# Patient Record
Sex: FEMALE | Race: BLACK OR AFRICAN AMERICAN | Employment: PART TIME | URBAN - METROPOLITAN AREA
[De-identification: names, ages, dates, MRNs, and addresses within clinical notes are randomized per-mention and may not be internally consistent; named-entity substitution may affect disease eponyms.]

---

## 2017-03-28 ENCOUNTER — OFFICE VISIT (OUTPATIENT)
Dept: INTERNAL MEDICINE CLINIC | Age: 67
End: 2017-03-28

## 2017-03-28 VITALS
HEART RATE: 72 BPM | SYSTOLIC BLOOD PRESSURE: 131 MMHG | WEIGHT: 212.4 LBS | TEMPERATURE: 97.8 F | BODY MASS INDEX: 40.1 KG/M2 | HEIGHT: 61 IN | OXYGEN SATURATION: 97 % | DIASTOLIC BLOOD PRESSURE: 84 MMHG

## 2017-03-28 DIAGNOSIS — Z00.00 ROUTINE GENERAL MEDICAL EXAMINATION AT A HEALTH CARE FACILITY: ICD-10-CM

## 2017-03-28 DIAGNOSIS — I10 ESSENTIAL HYPERTENSION: ICD-10-CM

## 2017-03-28 DIAGNOSIS — E11.9 CONTROLLED TYPE 2 DIABETES MELLITUS WITHOUT COMPLICATION, WITHOUT LONG-TERM CURRENT USE OF INSULIN (HCC): Primary | ICD-10-CM

## 2017-03-28 DIAGNOSIS — E78.00 PURE HYPERCHOLESTEROLEMIA: ICD-10-CM

## 2017-03-28 DIAGNOSIS — J44.9 CHRONIC OBSTRUCTIVE PULMONARY DISEASE, UNSPECIFIED COPD TYPE (HCC): ICD-10-CM

## 2017-03-28 DIAGNOSIS — Z13.39 SCREENING FOR ALCOHOLISM: ICD-10-CM

## 2017-03-28 NOTE — MR AVS SNAPSHOT
Visit Information Date & Time Provider Department Dept. Phone Encounter #  
 3/28/2017 11:30 AM Kalani Cooper, [address] [place] [address],[address] [phone] [id number] Follow-up Instructions Return in about 4 months (around 7/28/2017) for dm-2 htn hld copd. Upcoming Health Maintenance Date Due  
 EYE EXAM RETINAL OR DILATED Q1 6/3/2014 GLAUCOMA SCREENING Q2Y 11/30/2015 MEDICARE YEARLY EXAM 11/30/2015 Pneumococcal 65+ Low/Medium Risk (2 of 2 - PCV13) 1/25/2017 FOOT EXAM Q1 1/25/2017 MICROALBUMIN Q1 4/25/2017 HEMOGLOBIN A1C Q6M 5/23/2017 LIPID PANEL Q1 8/4/2017 BREAST CANCER SCRN MAMMOGRAM 3/21/2018 COLONOSCOPY 9/12/2018 DTaP/Tdap/Td series (2 - Td) 6/30/2025 Allergies as of 3/28/2017  Review Complete On: 3/28/2017 By: Kalani Copoer MD  
 No Known Allergies Current Immunizations  Reviewed on 1/6/2014 Name Date Influenza Vaccine Split 11/15/2011  3:42 PM  
 Pneumococcal Polysaccharide (PPSV-23) 1/25/2016 Tdap 6/30/2015 Not reviewed this visit You Were Diagnosed With   
  
 Codes Comments Controlled type 2 diabetes mellitus without complication, without long-term current use of insulin ([facility]    -  Primary ICD-10-CM: E11.9 ICD-9-CM: 250.00 Essential hypertension     ICD-10-CM: I10 
ICD-9-CM: 401.9 Pure hypercholesterolemia     ICD-10-CM: E78.00 ICD-9-CM: 272.0 Chronic obstructive pulmonary disease, unspecified COPD type ([facility]     ICD-10-CM: J44.9 ICD-9-CM: 221 Routine general medical examination at a health care facility     ICD-10-CM: Z00.00 ICD-9-CM: V70.0 Screening for alcoholism     ICD-10-CM: Z13.89 ICD-9-CM: V79.1 Vitals BP Pulse Temp Height(growth percentile) Weight(growth percentile) SpO2  
 131/84 (BP 1 Location: Left arm, BP Patient Position: Sitting) 72 97.8 °F (36.6 °C) (Oral) 5' 1\" (1.549 m) 212 lb 6.4 oz (96.3 kg) 97% BMI OB Status Smoking Status 40.13 kg/m2 Postmenopausal Former Smoker BMI and BSA Data Body Mass Index Body Surface Area  
 40.13 kg/m 2 2.04 m 2 Preferred Pharmacy Pharmacy Name Phone Kirsty Caballero [address]  [place] [phone] Your Updated Medication List  
  
   
This list is accurate as of: 3/28/17 12:13 PM.  Always use your most recent med list.  
  
  
  
  
 * albuterol 2.5 mg /3 mL (0.083 %) nebulizer solution Commonly known as:  PROVENTIL VENTOLIN  
1.5 mL by Nebulization route every four (4) hours as needed for Wheezing. * VENTOLIN HFA 90 mcg/actuation inhaler Generic drug:  albuterol INHALE 2 PUFFS BY MOUTH EVERY 4 HOURS AS NEEDED FOR WHEEZING  
  
 albuterol 90 mcg/actuation inhaler Commonly known as:  Zannie Boatman Take 2 Puffs by inhalation every six (6) hours as needed. aspirin, buffered 81 mg Tab Take  by mouth. atorvastatin 10 mg tablet Commonly known as:  LIPITOR Take 1 Tab by mouth nightly. fluticasone-vilanterol 200-25 mcg/dose inhaler Commonly known as:  BREO ELLIPTA Take 1 Puff by inhalation daily. glucose blood VI test strips strip Commonly known as:  TRUETRACK TEST  
1 Each by Does Not Apply route Daily (before breakfast). lisinopril-hydroCHLOROthiazide 20-25 mg per tablet Commonly known as:  Reynaldo Arthur Take 1 Tab by mouth daily. metFORMIN 500 mg tablet Commonly known as:  GLUCOPHAGE Take 1 Tab by mouth two (2) times daily (with meals). MULTIVITAL PO Take  by mouth daily. * Notice: This list has 2 medication(s) that are the same as other medications prescribed for you. Read the directions carefully, and ask your doctor or other care provider to review them with you. We Performed the Following HEMOGLOBIN A1C WITH EAG [31423 CPT(R)] LIPID PANEL [74365 CPT(R)] METABOLIC PANEL, COMPREHENSIVE [63387 CPT(R)] Follow-up Instructions Return in about 4 months (around 7/28/2017) for dm-2 htn hld copd. Introducing [facility] & HEALTH SERVICES! Melinda Jennifer introduces FAD ? IO patient portal. Now you can access parts of your medical record, email your doctor's office, and request medication refills online. 1. In your internet browser, go to [email or web address] 2. Click on the First Time User? Click Here link in the Sign In box. You will see the New Member Sign Up page. 3. Enter your F[employer] Access Code exactly as it appears below. You will not need to use this code after youve completed the sign-up process. If you do not sign up before the expiration date, you must request a new code. · [employer] Access Code: [email or web address] Expires: 6/26/2017 12:11 PM 
 
4. Enter the last four digits of your Social Security Number (xxxx) and Date of Birth (mm/dd/yyyy) as indicated and click Submit. You will be taken to the next sign-up page. 5. Create a FAD [employer] ID. This will be your [employer] login ID and cannot be changed, so think of one that is secure and easy to remember. 6. Create a [employer] IO password. You can change your password at any time. 7. Enter your Password Reset Question and Answer. This can be used at a later time if you forget your password. 8. Enter your e-mail address. You will receive e-mail notification when new information is available in [address] 9. Click Sign Up. You can now view and download portions of your medical record. 10. Click the Download Summary menu link to download a portable copy of your medical information. If you have questions, please visit the Frequently Asked Questions section of the FAD ? IO website. Remember, FAD ? IO is NOT to be used for urgent needs. For medical emergencies, dial 911. Now available from your iPhone and Android! Please provide this summary of care documentation to your next provider. Your primary care clinician is listed as Jaskaran GÓMEZ. If you have any questions after today's visit, please call [phone].

## 2017-03-28 NOTE — PROGRESS NOTES
HISTORY OF PRESENT ILLNESS  Eder Alcaraz is a 77 y.o. female. HPI     F/u DM-2 htn hld   fsbs in am avg about 120 per pt  Started to exercise with a  and feels well  Did not fill rx for breo and did not see cardiologist for ROLON   No cp sxs  No sxs of neuropathy in feet per pt    Patient Active Problem List    Diagnosis Date Noted    COPD (chronic obstructive pulmonary disease) ([facility] 06/30/2015    Type II or unspecified type diabetes mellitus without mention of complication, not stated as uncontrolled 09/03/2013    Obesity 05/10/2011    HTN (hypertension) 11/08/2010    Asthma 02/21/2010    Pure hypercholesterolemia 02/21/2010    Colon polyp 02/21/2010    Fibrocystic breast 02/21/2010    Fibroid uterus 02/21/2010    Colitis 02/21/2010     Current Outpatient Prescriptions   Medication Sig Dispense Refill    VENTOLIN HFA 90 mcg/actuation inhaler INHALE 2 PUFFS BY MOUTH EVERY 4 HOURS AS NEEDED FOR WHEEZING 1 Inhaler 6    metFORMIN (GLUCOPHAGE) 500 mg tablet Take 1 Tab by mouth two (2) times daily (with meals). 60 Tab 11    atorvastatin (LIPITOR) 10 mg tablet Take 1 Tab by mouth nightly. 90 Tab 3    lisinopril-hydrochlorothiazide (PRINZIDE, ZESTORETIC) 20-25 mg per tablet Take 1 Tab by mouth daily. 90 Tab 3    Aspirin, Buffered 81 mg tab Take  by mouth.  albuterol (PROVENTIL VENTOLIN) 2.5 mg /3 mL (0.083 %) nebulizer solution 1.5 mL by Nebulization route every four (4) hours as needed for Wheezing. 1 Package 6    glucose blood VI test strips (TRUETRACK TEST) strip 1 Each by Does Not Apply route Daily (before breakfast). 1 Package 11    FA/MV,CA,FE,MIN/LYCOPENE/LUT (MULTIVITAL PO) Take  by mouth daily.  albuterol (PROVENTIL, VENTOLIN) 90 mcg/Actuation inhaler Take 2 Puffs by inhalation every six (6) hours as needed. 90 g 11    fluticasone-vilanterol (BREO ELLIPTA) 200-25 mcg/dose inhaler Take 1 Puff by inhalation daily.  1 Each 11     Past Medical History:   Diagnosis Date    Asthma  Hypertension       Lab Results  Component Value Date/Time   Hemoglobin A1c 7.2 11/23/2016 12:25 PM   Hemoglobin A1c 7.0 06/30/2015 10:14 AM   Hemoglobin A1c 7.0 10/30/2014 02:38 PM   Glucose 87 11/23/2016 12:25 PM   Glucose POC 88 11/23/2012 03:22 PM   Microalb/Creat ratio (ug/mg creat.) 4.7 04/25/2016 08:43 AM   LDL, calculated 106 08/04/2016 10:08 AM   Creatinine 0.80 11/23/2016 12:25 PM      Lab Results  Component Value Date/Time   Cholesterol, total 172 08/04/2016 10:08 AM   HDL Cholesterol 52 08/04/2016 10:08 AM   LDL, calculated 106 08/04/2016 10:08 AM   Triglyceride 72 08/04/2016 10:08 AM       Lab Results  Component Value Date/Time   GFR est AA 89 11/23/2016 12:25 PM   GFR est non-AA 78 11/23/2016 12:25 PM   Creatinine 0.80 11/23/2016 12:25 PM   BUN 10 11/23/2016 12:25 PM   Sodium 144 11/23/2016 12:25 PM   Potassium 4.6 11/23/2016 12:25 PM   Chloride 103 11/23/2016 12:25 PM   CO2 26 11/23/2016 12:25 PM         ROS    Physical Exam   Constitutional: She appears well-developed and well-nourished. Appears stated age   Cardiovascular: Normal rate, regular rhythm and normal heart sounds. Exam reveals no gallop and no friction rub. No murmur heard. Pulmonary/Chest: Effort normal and breath sounds normal. No respiratory distress. She has no wheezes. Abdominal: Soft. Bowel sounds are normal.   Musculoskeletal: She exhibits no edema. Neurological: She is alert. Diabetic foot exam performed by Marylene Prim, MD       Measurement  Response Nurse Comment Physician Comment  Monofilament  R - normal sensation with micro filament  L - normal sensation with micro filament    Pulse DP R - 2+ (normal)  L - 2+ (normal)    Pulse TP R - 2+ (normal)  L - 2+ (normal)    Structural deformity R - None  L - None    Skin Integrity / Deformity R - None  L - None       Reviewed by:         Psychiatric: She has a normal mood and affect. Nursing note and vitals reviewed.       ASSESSMENT and PLAN  Beata Sellers was seen today for diabetes, hypertension and cholesterol problem. Diagnoses and all orders for this visit:    Controlled type 2 diabetes mellitus without complication, without long-term current use of insulin (HCC)  -     METABOLIC PANEL, COMPREHENSIVE  -     HEMOGLOBIN A1C WITH EAG   contorlled per pt log but last a1c was 7.2 , can titate up metformin if still above goal    Essential hypertension  -     METABOLIC PANEL, COMPREHENSIVE   controlled  Pure hypercholesterolemia  -     METABOLIC PANEL, COMPREHENSIVE  -     LIPID PANEL    Chronic obstructive pulmonary disease, unspecified COPD type (HCC)   Mild by sxs, albuterol prn  Routine general medical examination at a health care facility    Screening for alcoholism      Follow-up Disposition:  Return in about 4 months (around 7/28/2017) for dm-2 htn hld copd.

## 2017-03-28 NOTE — PROGRESS NOTES
This is a Subsequent Medicare Annual Wellness Visit providing Personalized Prevention Plan Services (PPPS) (Performed 12 months after initial AWV and PPPS )    I have reviewed the patient's medical history in detail and updated the computerized patient record. History     Past Medical History:   Diagnosis Date    Asthma     Hypertension       No past surgical history on file. Current Outpatient Prescriptions   Medication Sig Dispense Refill    VENTOLIN HFA 90 mcg/actuation inhaler INHALE 2 PUFFS BY MOUTH EVERY 4 HOURS AS NEEDED FOR WHEEZING 1 Inhaler 6    metFORMIN (GLUCOPHAGE) 500 mg tablet Take 1 Tab by mouth two (2) times daily (with meals). 60 Tab 11    atorvastatin (LIPITOR) 10 mg tablet Take 1 Tab by mouth nightly. 90 Tab 3    lisinopril-hydrochlorothiazide (PRINZIDE, ZESTORETIC) 20-25 mg per tablet Take 1 Tab by mouth daily. 90 Tab 3    Aspirin, Buffered 81 mg tab Take  by mouth.  albuterol (PROVENTIL VENTOLIN) 2.5 mg /3 mL (0.083 %) nebulizer solution 1.5 mL by Nebulization route every four (4) hours as needed for Wheezing. 1 Package 6    glucose blood VI test strips (TRUETRACK TEST) strip 1 Each by Does Not Apply route Daily (before breakfast). 1 Package 11    FA/MV,CA,FE,MIN/LYCOPENE/LUT (MULTIVITAL PO) Take  by mouth daily.  albuterol (PROVENTIL, VENTOLIN) 90 mcg/Actuation inhaler Take 2 Puffs by inhalation every six (6) hours as needed. 90 g 11    fluticasone-vilanterol (BREO ELLIPTA) 200-25 mcg/dose inhaler Take 1 Puff by inhalation daily.  1 Each 11     No Known Allergies  Family History   Problem Relation Age of Onset    Diabetes Mother     High Cholesterol Mother     Hypertension Mother     Diabetes Sister     Hypertension Sister      Social History   Substance Use Topics    Smoking status: Former Smoker     Packs/day: 0.50     Years: 30.00     Quit date: 1/1/2011    Smokeless tobacco: Never Used    Alcohol use No     Patient Active Problem List   Diagnosis Code    Asthma J45.909    Pure hypercholesterolemia E78.00    Colon polyp K63.5    Fibrocystic breast N60.19    Fibroid uterus D25.9    Colitis K52.9    HTN (hypertension) I10    Obesity E66.9    Type II or unspecified type diabetes mellitus without mention of complication, not stated as uncontrolled E11.9    COPD (chronic obstructive pulmonary disease) ([facility] J44.9       Depression Risk Factor Screening:     PHQ 2 / 9, over the last two weeks 11/23/2016   Little interest or pleasure in doing things Not at all   Feeling down, depressed or hopeless Not at all   Total Score PHQ 2 0     Alcohol Risk Factor Screening: On any occasion during the past 3 months, have you had more than 3 drinks containing alcohol? No    Do you average more than 7 drinks per week? No      Functional Ability and Level of Safety:     Hearing Loss   mild    Activities of Daily Living   Self-care. Requires assistance with: no ADLs    Fall Risk     Fall Risk Assessment, last 12 mths 11/23/2016   Able to walk? Yes   Fall in past 12 months? No     Abuse Screen   Patient is not abused    Review of Systems   A comprehensive review of systems was negative except for that written in the HPI. Physical Examination     Evaluation of Cognitive Function:  Mood/affect:  neutral  Appearance: age appropriate, casually dressed and younger than stated age  Family member/caregiver input: none    Visit Vitals    /84 (BP 1 Location: Left arm, BP Patient Position: Sitting)    Pulse 72    Temp 97.8 °F (36.6 °C) (Oral)    Ht 5' 1\" (1.549 m)    Wt 212 lb 6.4 oz (96.3 kg)    SpO2 97%    BMI 40.13 kg/m2     General:  Alert, cooperative, no distress, appears stated age. Head:  Normocephalic, without obvious abnormality, atraumatic. Eyes:  Conjunctivae/corneas clear. PERRL, EOMs intact. Fundi benign. Ears:  Normal TMs and external ear canals both ears. Nose: Nares normal. Septum midline. Mucosa normal. No drainage or sinus tenderness. Throat: Lips, mucosa, and tongue normal. Teeth and gums normal.   Neck: Supple, symmetrical, trachea midline, no adenopathy, thyroid: no enlargement/tenderness/nodules, no carotid bruit and no JVD. Back:   Symmetric, no curvature. ROM normal. No CVA tenderness. Lungs:   Clear to auscultation bilaterally. Chest wall:  No tenderness or deformity. Heart:  Regular rate and rhythm, S1, S2 normal, no murmur, click, rub or gallop. Breast Exam:  No tenderness, masses, or nipple abnormality. Abdomen:   Soft, non-tender. Bowel sounds normal. No masses,  No organomegaly. Genitalia:  Normal female without lesion, discharge or tenderness. Rectal:  Normal tone,  no masses or tenderness  Guaiac negative stool. Extremities: Extremities normal, atraumatic, no cyanosis or edema. Pulses: 2+ and symmetric all extremities. Skin: Skin color, texture, turgor normal. No rashes or lesions. Lymph nodes: Cervical, supraclavicular, and axillary nodes normal.   Neurologic: CNII-XII intact. Normal strength, sensation and reflexes throughout. Patient Care Team:  Viji Enriquez MD as PCP - General    Advice/Referrals/Counseling   Education and counseling provided:  Are appropriate based on today's review and evaluation  End-of-Life planning (with patient's consent)-see ACP note  Pneumococcal Vaccine-advised prevar 13 at local pharmacy  Screening for glaucoma-pt will eye an eye doctor for dilated exam      Assessment/Plan   Isabel Esparza was seen today for diabetes, hypertension and cholesterol problem.     Diagnoses and all orders for this visit:    Controlled type 2 diabetes mellitus without complication, without long-term current use of insulin (HCC)  -     METABOLIC PANEL, COMPREHENSIVE  -     HEMOGLOBIN A1C WITH EAG    Essential hypertension  -     METABOLIC PANEL, COMPREHENSIVE    Pure hypercholesterolemia  -     METABOLIC PANEL, COMPREHENSIVE  -     LIPID PANEL    Chronic obstructive pulmonary disease, unspecified COPD type [facility])      Follow-up Disposition:  Return in about 4 months (around 7/28/2017) for dm-2 htn hld copd. Maverick Short

## 2017-03-29 LAB
ALBUMIN SERPL-MCNC: 3.9 G/DL (ref 3.6–4.8)
ALBUMIN/GLOB SERPL: 1.3 {RATIO} (ref 1.2–2.2)
ALP SERPL-CCNC: 109 IU/L (ref 39–117)
ALT SERPL-CCNC: 23 IU/L (ref 0–32)
AST SERPL-CCNC: 24 IU/L (ref 0–40)
BILIRUB SERPL-MCNC: 0.5 MG/DL (ref 0–1.2)
BUN SERPL-MCNC: 11 MG/DL (ref 8–27)
BUN/CREAT SERPL: 16 (ref 11–26)
CALCIUM SERPL-MCNC: 9.1 MG/DL (ref 8.7–10.3)
CHLORIDE SERPL-SCNC: 102 MMOL/L (ref 96–106)
CHOLEST SERPL-MCNC: 177 MG/DL (ref 100–199)
CO2 SERPL-SCNC: 28 MMOL/L (ref 18–29)
CREAT SERPL-MCNC: 0.7 MG/DL (ref 0.57–1)
EST. AVERAGE GLUCOSE BLD GHB EST-MCNC: 148 MG/DL
GLOBULIN SER CALC-MCNC: 3.1 G/DL (ref 1.5–4.5)
GLUCOSE SERPL-MCNC: 91 MG/DL (ref 65–99)
HBA1C MFR BLD: 6.8 % (ref 4.8–5.6)
HDLC SERPL-MCNC: 56 MG/DL
LDLC SERPL CALC-MCNC: 103 MG/DL (ref 0–99)
POTASSIUM SERPL-SCNC: 4.4 MMOL/L (ref 3.5–5.2)
PROT SERPL-MCNC: 7 G/DL (ref 6–8.5)
SODIUM SERPL-SCNC: 144 MMOL/L (ref 134–144)
TRIGL SERPL-MCNC: 92 MG/DL (ref 0–149)
VLDLC SERPL CALC-MCNC: 18 MG/DL (ref 5–40)

## 2017-07-31 DIAGNOSIS — J45.909 ASTHMATIC BRONCHITIS, UNSPECIFIED ASTHMA SEVERITY, UNCOMPLICATED: ICD-10-CM

## 2017-07-31 RX ORDER — ALBUTEROL SULFATE 90 UG/1
2 AEROSOL, METERED RESPIRATORY (INHALATION)
Qty: 1 INHALER | Refills: 6 | Status: SHIPPED | OUTPATIENT
Start: 2017-07-31 | End: 2017-12-14 | Stop reason: ALTCHOICE

## 2017-08-22 ENCOUNTER — OFFICE VISIT (OUTPATIENT)
Dept: INTERNAL MEDICINE CLINIC | Age: 67
End: 2017-08-22

## 2017-08-22 VITALS
OXYGEN SATURATION: 97 % | HEART RATE: 73 BPM | HEIGHT: 61 IN | TEMPERATURE: 97.9 F | WEIGHT: 212 LBS | SYSTOLIC BLOOD PRESSURE: 131 MMHG | DIASTOLIC BLOOD PRESSURE: 72 MMHG | BODY MASS INDEX: 40.02 KG/M2

## 2017-08-22 DIAGNOSIS — J44.9 CHRONIC OBSTRUCTIVE PULMONARY DISEASE, UNSPECIFIED COPD TYPE (HCC): ICD-10-CM

## 2017-08-22 DIAGNOSIS — M25.511 ACUTE PAIN OF RIGHT SHOULDER: ICD-10-CM

## 2017-08-22 DIAGNOSIS — I10 ESSENTIAL HYPERTENSION: ICD-10-CM

## 2017-08-22 DIAGNOSIS — Z23 ENCOUNTER FOR IMMUNIZATION: ICD-10-CM

## 2017-08-22 DIAGNOSIS — E78.00 PURE HYPERCHOLESTEROLEMIA: ICD-10-CM

## 2017-08-22 DIAGNOSIS — E11.9 CONTROLLED TYPE 2 DIABETES MELLITUS WITHOUT COMPLICATION, WITHOUT LONG-TERM CURRENT USE OF INSULIN (HCC): Primary | ICD-10-CM

## 2017-08-22 DIAGNOSIS — Z12.31 VISIT FOR SCREENING MAMMOGRAM: ICD-10-CM

## 2017-08-22 RX ORDER — METFORMIN HYDROCHLORIDE 500 MG/1
500 TABLET ORAL 2 TIMES DAILY WITH MEALS
Qty: 180 TAB | Refills: 3 | Status: SHIPPED | OUTPATIENT
Start: 2017-08-22 | End: 2018-10-30 | Stop reason: SDUPTHER

## 2017-08-22 NOTE — MR AVS SNAPSHOT
Visit Information Date & Time Provider Department Dept. Phone Encounter #  
 8/22/2017  3:00 PM Larissa Dumont, [address] [place] [address],[address] [phone] [id number] Follow-up Instructions Return in about 4 months (around 12/22/2017) for dm-2 htn hld. Upcoming Health Maintenance Date Due  
 EYE EXAM RETINAL OR DILATED Q1 6/3/2014 GLAUCOMA SCREENING Q2Y 11/30/2015 Pneumococcal 65+ Low/Medium Risk (2 of 2 - PCV13) 1/25/2017 MICROALBUMIN Q1 4/25/2017 INFLUENZA AGE 9 TO ADULT 8/1/2017 HEMOGLOBIN A1C Q6M 9/28/2017 BREAST CANCER SCRN MAMMOGRAM 3/21/2018 FOOT EXAM Q1 3/28/2018 LIPID PANEL Q1 3/28/2018 MEDICARE YEARLY EXAM 3/29/2018 COLONOSCOPY 9/12/2018 DTaP/Tdap/Td series (2 - Td) 6/30/2025 Allergies as of 8/22/2017  Review Complete On: 8/22/2017 By: Larissa Dumont MD  
 No Known Allergies Current Immunizations  Reviewed on 1/6/2014 Name Date Influenza Vaccine Split 11/15/2011  3:42 PM  
 Pneumococcal Conjugate (PCV-13)  Incomplete Pneumococcal Polysaccharide (PPSV-23) 1/25/2016 Tdap 6/30/2015 Not reviewed this visit You Were Diagnosed With   
  
 Codes Comments Controlled type 2 diabetes mellitus without complication, without long-term current use of insulin [facility]    -  Primary ICD-10-CM: E11.9 ICD-9-CM: 250.00 Essential hypertension     ICD-10-CM: I10 
ICD-9-CM: 401.9 Pure hypercholesterolemia     ICD-10-CM: E78.00 ICD-9-CM: 272.0 Chronic obstructive pulmonary disease, unspecified COPD type ([facility]     ICD-10-CM: J44.9 ICD-9-CM: 829 Encounter for immunization     ICD-10-CM: B14 ICD-9-CM: V03.89 Visit for screening mammogram     ICD-10-CM: Z12.31 
ICD-9-CM: V76.12 Acute pain of right shoulder     ICD-10-CM: M25.511 ICD-9-CM: 719.41 Vitals  BP Pulse Temp Height(growth percentile) Weight(growth percentile) SpO2  
 131/72 (BP 1 Location: Left arm, BP Patient Position: Sitting) 73 97.9 °F (36.6 °C) (Oral) 5' 1\" (1.549 m) 212 lb (96.2 kg) 97% BMI OB Status Smoking Status 40.06 kg/m2 Postmenopausal Former Smoker BMI and BSA Data Body Mass Index Body Surface Area 40.06 kg/m 2 2.03 m 2 Preferred Pharmacy Pharmacy Name Phone Kirsty Caballero [address] [address]  [place] [phone] Your Updated Medication List  
  
   
This list is accurate as of: 8/22/17  3:19 PM.  Always use your most recent med list.  
  
  
  
  
 * albuterol 2.5 mg /3 mL (0.083 %) nebulizer solution Commonly known as:  PROVENTIL VENTOLIN  
1.5 mL by Nebulization route every four (4) hours as needed for Wheezing. * albuterol 90 mcg/actuation inhaler Commonly known as:  VENTOLIN HFA Take 2 Puffs by inhalation every four (4) hours as needed for Wheezing. albuterol 90 mcg/actuation inhaler Commonly known as:  Anahi Bouche Take 2 Puffs by inhalation every six (6) hours as needed. aspirin, buffered 81 mg Tab Take  by mouth. atorvastatin 10 mg tablet Commonly known as:  LIPITOR Take 1 Tab by mouth nightly. fluticasone-vilanterol 200-25 mcg/dose inhaler Commonly known as:  BREO ELLIPTA Take 1 Puff by inhalation daily. glucose blood VI test strips strip Commonly known as:  TRUETRACK TEST  
1 Each by Does Not Apply route Daily (before breakfast). lisinopril-hydroCHLOROthiazide 20-25 mg per tablet Commonly known as:  Nuno Sarah Take 1 Tab by mouth daily. metFORMIN 500 mg tablet Commonly known as:  GLUCOPHAGE Take 1 Tab by mouth two (2) times daily (with meals). MULTIVITAL PO Take  by mouth daily. * Notice: This list has 2 medication(s) that are the same as other medications prescribed for you. Read the directions carefully, and ask your doctor or other care provider to review them with you. Prescriptions Sent to Pharmacy Refills  
 metFORMIN (GLUCOPHAGE) 500 mg tablet 3 Sig: Take 1 Tab by mouth two (2) times daily (with meals). Class: Normal  
 Pharmacy: [facility] [address] Celine Rubinstein [address] [place] [address] #: [phone] Route: Oral  
  
We Performed the Following HEMOGLOBIN A1C WITH EAG [84654 CPT(R)] LIPID PANEL [37991 CPT(R)] MICROALBUMIN, UR, RAND W/ MICROALBUMIN/CREA RATIO [id number] CPT(R)] PNEUMOCOCCAL CONJ VACCINE 13 VALENT IM [id number] CPT(R)] REFERRAL TO ORTHOPEDICS [CJT915 Custom] Comments:  
 Please evaluate patient for shoulder pain. Follow-up Instructions Return in about 4 months (around 12/22/2017) for dm-2 htn hld. To-Do List   
 08/28/2017 Imaging:  [facility] MAMMO BI SCREENING INCL CAD Referral Information Referral ID Referred By Referred To  
  
 [id number] REINALDO GÓMEZ [facility]   
   [address] Phone: [phone] Visits Status Start Date End Date 1 New Request 8/22/17 8/22/18 If your referral has a status of pending review or denied, additional information will be sent to support the outcome of this decision. [facility]! [facility] introduces [employer] patient portal. Now you can access parts of your medical record, email your doctor's office, and request medication refills online. 1. In your internet browser, go to [email or web address] 2. Click on the First Time User? Click Here link in the Sign In box. You will see the New Member Sign Up page. 3. Enter your [employer] Access Code exactly as it appears below. You will not need to use this code after youve completed the sign-up process. If you do not sign up before the expiration date, you must request a new code. · [employer] Access Code: [email or web address] Expires: 11/20/2017  3:18 PM 
 
 4. Enter the last four digits of your Social Security Number (xxxx) and Date of Birth (mm/dd/yyyy) as indicated and click Submit. You will be taken to the next sign-up page. 5. Create a [employer] ID. This will be your [employer] login ID and cannot be changed, so think of one that is secure and easy to remember. 6. Create a [employer] password. You can change your password at any time. 7. Enter your Password Reset Question and Answer. This can be used at a later time if you forget your password. 8. Enter your e-mail address. You will receive e-mail notification when new information is available in [address] 9. Click Sign Up. You can now view and download portions of your medical record. 10. Click the Download Summary menu link to download a portable copy of your medical information. If you have questions, please visit the Frequently Asked Questions section of the [employer] website. Remember, [employer] is NOT to be used for urgent needs. For medical emergencies, dial 911. Now available from your iPhone and Android! Please provide this summary of care documentation to your next provider. Your primary care clinician is listed as Maria Esther GÓMEZ. If you have any questions after today's visit, please call [phone].

## 2017-08-22 NOTE — PROGRESS NOTES
HISTORY OF PRESENT ILLNESS  Alexia Valiente is a 77 y.o. female. HPI      F/u DM-2 htn hld   fsbs in am avg about 120 per pt  After meals 1 hr later 102  Started to exercise with a  once per week and goes 3 other days per week and feels well  Did not fill rx for breo and did not see cardiologist for ROLNO   No cp sxs  No sxs of neuropathy in feet per pt  She landed on her right arm in June and sill has some pain with ROM --external ROM       Patient Active Problem List    Diagnosis Date Noted    COPD (chronic obstructive pulmonary disease) ([facility] [address] 06/30/2015    Type II or unspecified type diabetes mellitus without mention of complication, not stated as uncontrolled 09/03/2013    Obesity 05/10/2011    HTN (hypertension) 11/08/2010    Asthma 02/21/2010    Pure hypercholesterolemia 02/21/2010    Colon polyp 02/21/2010    Fibrocystic breast 02/21/2010    Fibroid uterus 02/21/2010    Colitis 02/21/2010     Current Outpatient Prescriptions   Medication Sig Dispense Refill    metFORMIN (GLUCOPHAGE) 500 mg tablet TAKE 1 TABLET BY MOUTH TWICE DAILY WITH MEALS 60 Tab 11    atorvastatin (LIPITOR) 10 mg tablet Take 1 Tab by mouth nightly. 90 Tab 3    lisinopril-hydrochlorothiazide (PRINZIDE, ZESTORETIC) 20-25 mg per tablet Take 1 Tab by mouth daily. 90 Tab 3    Aspirin, Buffered 81 mg tab Take  by mouth.  glucose blood VI test strips (TRUETRACK TEST) strip 1 Each by Does Not Apply route Daily (before breakfast). 1 Package 11    FA/MV,CA,FE,MIN/LYCOPENE/LUT (MULTIVITAL PO) Take  by mouth daily.  albuterol (VENTOLIN HFA) 90 mcg/actuation inhaler Take 2 Puffs by inhalation every four (4) hours as needed for Wheezing. 1 Inhaler 6    fluticasone-vilanterol (BREO ELLIPTA) 200-25 mcg/dose inhaler Take 1 Puff by inhalation daily. 1 Each 11    albuterol (PROVENTIL VENTOLIN) 2.5 mg /3 mL (0.083 %) nebulizer solution 1.5 mL by Nebulization route every four (4) hours as needed for Wheezing.  1 Package 6    albuterol (PROVENTIL, VENTOLIN) 90 mcg/Actuation inhaler Take 2 Puffs by inhalation every six (6) hours as needed. 90 g 11     No Known Allergies   Lab Results  Component Value Date/Time   Hemoglobin A1c 6.8 03/28/2017 12:23 PM   Hemoglobin A1c 7.2 11/23/2016 12:25 PM   Hemoglobin A1c 7.0 06/30/2015 10:14 AM   Glucose 91 03/28/2017 12:23 PM   Glucose POC 88 11/23/2012 03:22 PM   Microalb/Creat ratio (ug/mg creat.) 4.7 04/25/2016 08:43 AM   LDL, calculated 103 03/28/2017 12:23 PM   Creatinine 0.70 03/28/2017 12:23 PM      Lab Results  Component Value Date/Time   Cholesterol, total 177 03/28/2017 12:23 PM   Cholesterol (POC) 184 03/17/2015 11:23 AM   HDL Cholesterol 56 03/28/2017 12:23 PM   LDL, calculated 103 03/28/2017 12:23 PM   LDL Cholesterol (POC) 117 03/17/2015 11:23 AM   Triglyceride 92 03/28/2017 12:23 PM   Triglycerides (POC) 76 03/17/2015 11:23 AM     Lab Results  Component Value Date/Time   GFR est non-AA 91 03/28/2017 12:23 PM   GFR est  03/28/2017 12:23 PM   Creatinine 0.70 03/28/2017 12:23 PM   BUN 11 03/28/2017 12:23 PM   Sodium 144 03/28/2017 12:23 PM   Potassium 4.4 03/28/2017 12:23 PM   Chloride 102 03/28/2017 12:23 PM   CO2 28 03/28/2017 12:23 PM          ROS    Physical Exam   Constitutional: She appears well-developed and well-nourished. Appears stated age   Cardiovascular: Normal rate, regular rhythm and normal heart sounds. Exam reveals no gallop and no friction rub. No murmur heard. Pulmonary/Chest: Effort normal and breath sounds normal. No respiratory distress. She has no wheezes. Abdominal: Soft. Bowel sounds are normal.   Musculoskeletal: She exhibits no edema. Decreased with external ROM left shoulder   Neurological: She is alert. Psychiatric: She has a normal mood and affect. Nursing note and vitals reviewed. ASSESSMENT and PLAN  Diagnoses and all orders for this visit:    1.  Controlled type 2 diabetes mellitus without complication, without long-term current use of insulin (HCC)  -     MICROALBUMIN, UR, RAND W/ MICROALBUMIN/CREA RATIO  -     HEMOGLOBIN A1C WITH EAG   Retinal scan today   Controlled per home readings    2. Essential hypertension   controlled  3. Pure hypercholesterolemia  -     LIPID PANEL   Might need to increase dose of lipitor -discussed    4. Chronic obstructive pulmonary disease, unspecified COPD type (HCC)   Stable, no need for inhalers  5. Encounter for immunization  -     Pneumococcal conjugate 13 valent, IM (PREVNAR-13)    6. Visit for screening mammogram  -     [facility] MAMMO BI SCREENING INCL CAD; Future    7. Acute pain of right shoulder  -     REFERRAL TO ORTHOPEDICS    Other orders  -     metFORMIN (GLUCOPHAGE) 500 mg tablet; Take 1 Tab by mouth two (2) times daily (with meals). Follow-up Disposition:  Return in about 4 months (around 12/22/2017) for dm-2 htn hld.

## 2017-08-22 NOTE — PROGRESS NOTES
Patient is here today for her 4 month follow up. Cholesterol,hypertension,and diabetes.  Patient agreed  to do the retinal eye exam.

## 2017-08-23 NOTE — PROGRESS NOTES
Spoke with patient after 2 patient identifiers being note and advised per Dr. Cammy Fonseca, retinal scan normal and to repeat in 1 year. Patient expressed understanding and has no further questions at this time.

## 2017-09-11 ENCOUNTER — APPOINTMENT (OUTPATIENT)
Dept: INTERNAL MEDICINE CLINIC | Age: 67
End: 2017-09-11

## 2017-09-12 LAB
ALBUMIN/CREAT UR: 3.1 MG/G CREAT (ref 0–30)
CHOLEST SERPL-MCNC: 175 MG/DL (ref 100–199)
CREAT UR-MCNC: 117.8 MG/DL
EST. AVERAGE GLUCOSE BLD GHB EST-MCNC: 140 MG/DL
HBA1C MFR BLD: 6.5 % (ref 4.8–5.6)
HDLC SERPL-MCNC: 56 MG/DL
LDLC SERPL CALC-MCNC: 104 MG/DL (ref 0–99)
MICROALBUMIN UR-MCNC: 3.6 UG/ML
TRIGL SERPL-MCNC: 74 MG/DL (ref 0–149)
VLDLC SERPL CALC-MCNC: 15 MG/DL (ref 5–40)

## 2017-09-13 DIAGNOSIS — E78.00 PURE HYPERCHOLESTEROLEMIA: ICD-10-CM

## 2017-09-13 RX ORDER — ATORVASTATIN CALCIUM 20 MG/1
20 TABLET, FILM COATED ORAL DAILY
Qty: 90 TAB | Refills: 3 | Status: SHIPPED | OUTPATIENT
Start: 2017-09-13 | End: 2019-10-14 | Stop reason: SDUPTHER

## 2017-09-13 NOTE — PROGRESS NOTES
Tell pt labs look good--diabetes is well controlled.  Cholesterol levels are close to goal but LDL is slightly elevated--I increased her lipitor to 20 mg every day from 10mg every day--escribed

## 2017-09-14 NOTE — PROGRESS NOTES
Spoke with patient after 2 patient identifiers being note and advised per Dr. Zoë Duarte labs look good--diabetes is well controlled. Cholesterol levels are close to goal but LDL is slightly elevated--I increased her lipitor to 20 mg every day from 10mg every day--escribed  . Patient expressed understanding and has no further questions at this time.

## 2017-12-14 ENCOUNTER — OFFICE VISIT (OUTPATIENT)
Dept: INTERNAL MEDICINE CLINIC | Age: 67
End: 2017-12-14

## 2017-12-14 VITALS
SYSTOLIC BLOOD PRESSURE: 137 MMHG | BODY MASS INDEX: 39.46 KG/M2 | WEIGHT: 209 LBS | HEART RATE: 67 BPM | DIASTOLIC BLOOD PRESSURE: 78 MMHG | TEMPERATURE: 97.8 F | HEIGHT: 61 IN | OXYGEN SATURATION: 97 %

## 2017-12-14 DIAGNOSIS — E78.00 PURE HYPERCHOLESTEROLEMIA: ICD-10-CM

## 2017-12-14 DIAGNOSIS — I10 ESSENTIAL HYPERTENSION: ICD-10-CM

## 2017-12-14 DIAGNOSIS — E11.9 CONTROLLED TYPE 2 DIABETES MELLITUS WITHOUT COMPLICATION, WITHOUT LONG-TERM CURRENT USE OF INSULIN (HCC): Primary | ICD-10-CM

## 2017-12-14 NOTE — MR AVS SNAPSHOT
Visit Information Date & Time Provider Department Dept. Phone Encounter #  
 12/14/2017 11:30 AM Carla Burr, [address] [phone] [id number] Follow-up Instructions Return in about 6 months (around 6/14/2018) for dm-2 htn hld. Upcoming Health Maintenance Date Due  
 GLAUCOMA SCREENING Q2Y 11/30/2015 HEMOGLOBIN A1C Q6M 3/11/2018 FOOT EXAM Q1 3/28/2018 MEDICARE YEARLY EXAM 3/29/2018 EYE EXAM RETINAL OR DILATED Q1 8/22/2018 MICROALBUMIN Q1 9/11/2018 LIPID PANEL Q1 9/11/2018 COLONOSCOPY 9/12/2018 DTaP/Tdap/Td series (2 - Td) 6/30/2025 Allergies as of 12/14/2017  Review Complete On: 12/14/2017 By: Dany Dillon LPN No Known Allergies Current Immunizations  Reviewed on 1/6/2014 Name Date Influenza Vaccine Split 11/15/2011  3:42 PM  
 Pneumococcal Conjugate (PCV-13) 8/22/2017 Pneumococcal Polysaccharide (PPSV-23) 1/25/2016 Tdap 6/30/2015 Not reviewed this visit You Were Diagnosed With   
  
 Codes Comments Controlled type 2 diabetes mellitus without complication, without long-term current use of insulin [facility]    -  Primary ICD-10-CM: E11.9 ICD-9-CM: 250.00 Essential hypertension     ICD-10-CM: I10 
ICD-9-CM: 401.9 Pure hypercholesterolemia     ICD-10-CM: E78.00 ICD-9-CM: 272.0 Vitals BP Pulse Temp Height(growth percentile) Weight(growth percentile) SpO2  
 137/78 (BP 1 Location: Left arm, BP Patient Position: Sitting) 67 97.8 °F (36.6 °C) (Oral) 5' 1\" (1.549 m) 209 lb (94.8 kg) 97% BMI OB Status Smoking Status 39.49 kg/m2 Postmenopausal Former Smoker BMI and BSA Data Body Mass Index Body Surface Area  
 39.49 kg/m 2 2.02 m 2 Preferred Pharmacy Pharmacy Name Phone [facility] [address] [employer] [address]  [place] [phone] Your Updated Medication List  
  
   
 This list is accurate as of: 12/14/17 12:18 PM.  Always use your most recent med list.  
  
  
  
  
 albuterol 2.5 mg /3 mL (0.083 %) nebulizer solution Commonly known as:  PROVENTIL VENTOLIN  
1.5 mL by Nebulization route every four (4) hours as needed for Wheezing. aspirin, buffered 81 mg Tab Take  by mouth. atorvastatin 20 mg tablet Commonly known as:  LIPITOR Take 1 Tab by mouth daily. glucose blood VI test strips strip Commonly known as:  TRUETRACK TEST  
1 Each by Does Not Apply route Daily (before breakfast). lisinopril-hydroCHLOROthiazide 20-25 mg per tablet Commonly known as:  Reynaldo Arthur Take 1 Tab by mouth daily. metFORMIN 500 mg tablet Commonly known as:  GLUCOPHAGE Take 1 Tab by mouth two (2) times daily (with meals). MULTIVITAL PO Take  by mouth daily. We Performed the Following HEMOGLOBIN A1C WITH EAG [48326 CPT(R)] LIPID PANEL [60300 CPT(R)] METABOLIC PANEL, COMPREHENSIVE [76757 CPT(R)] Follow-up Instructions Return in about 6 months (around 6/14/2018) for dm-2 htn hld. Introducing [facility]! Anjana Garcia introduces [email or web address] patient portal. Now you can access parts of your medical record, email your doctor's office, and request medication refills online. 1. In your internet browser, go to [email or web address] 2. Click on the First Time User? Click Here link in the Sign In box. You will see the New Member Sign Up page. 3. Enter your [email or web address] Access Code exactly as it appears below. You will not need to use this code after youve completed the sign-up process. If you do not sign up before the expiration date, you must request a new code. · [email or web address] Access Code: [email or web address] Expires: 3/14/2018 12:18 PM 
 
4. Enter the last four digits of your Social Security Number (xxxx) and Date of Birth (mm/dd/yyyy) as indicated and click Submit.  You will be taken to the next sign-up page. 5. Create a [employer] ID. This will be your [employer] login ID and cannot be changed, so think of one that is secure and easy to remember. 6. Create a [employer] password. You can change your password at any time. 7. Enter your Password Reset Question and Answer. This can be used at a later time if you forget your password. 8. Enter your e-mail address. You will receive e-mail notification when new information is available in [address] 9. Click Sign Up. You can now view and download portions of your medical record. 10. Click the Download Summary menu link to download a portable copy of your medical information. If you have questions, please visit the Frequently Asked Questions section of the [employer] website. Remember, [employer] is NOT to be used for urgent needs. For medical emergencies, dial 911. Now available from your iPhone and Android! Please provide this summary of care documentation to your next provider. Your primary care clinician is listed as Elise GÓMEZ. If you have any questions after today's visit, please call [phone].

## 2017-12-14 NOTE — PROGRESS NOTES
HISTORY OF PRESENT ILLNESS  Adeola Rain is a 79 y.o. female. HPI      F/u DM-2 htn hld   fsbs in am avg about 120 per pt  Started to exercise with a  once per week and goes 3 other days per week and feels well  Did not fill rx for breo and did not see cardiologist for ROLON   No cp sxs  No sxs of neuropathy in feet per pt  lipitor dose was increased to 20 mg every day last visit but pt only takes about 3d /week        Patient Active Problem List    Diagnosis Date Noted    COPD (chronic obstructive pulmonary disease) ([facility] [address] 06/30/2015    Type II or unspecified type diabetes mellitus without mention of complication, not stated as uncontrolled 09/03/2013    Obesity 05/10/2011    HTN (hypertension) 11/08/2010    Asthma 02/21/2010    Pure hypercholesterolemia 02/21/2010    Colon polyp 02/21/2010    Fibrocystic breast 02/21/2010    Fibroid uterus 02/21/2010    Colitis 02/21/2010     Current Outpatient Prescriptions   Medication Sig Dispense Refill    atorvastatin (LIPITOR) 20 mg tablet Take 1 Tab by mouth daily. 90 Tab 3    metFORMIN (GLUCOPHAGE) 500 mg tablet Take 1 Tab by mouth two (2) times daily (with meals). 180 Tab 3    lisinopril-hydrochlorothiazide (PRINZIDE, ZESTORETIC) 20-25 mg per tablet Take 1 Tab by mouth daily. 90 Tab 3    Aspirin, Buffered 81 mg tab Take  by mouth.  albuterol (PROVENTIL VENTOLIN) 2.5 mg /3 mL (0.083 %) nebulizer solution 1.5 mL by Nebulization route every four (4) hours as needed for Wheezing. 1 Package 6    glucose blood VI test strips (TRUETRACK TEST) strip 1 Each by Does Not Apply route Daily (before breakfast). 1 Package 11    FA/MV,CA,FE,MIN/LYCOPENE/LUT (MULTIVITAL PO) Take  by mouth daily.        No Known Allergies   Lab Results  Component Value Date/Time   Hemoglobin A1c 6.5 09/11/2017 12:03 PM   Hemoglobin A1c 6.8 03/28/2017 12:23 PM   Hemoglobin A1c 7.2 11/23/2016 12:25 PM   Glucose 91 03/28/2017 12:23 PM   Glucose POC 88 11/23/2012 03:22 PM Microalb/Creat ratio (ug/mg creat.) 3.1 09/11/2017 12:03 PM   LDL, calculated 104 09/11/2017 12:03 PM   Creatinine 0.70 03/28/2017 12:23 PM      Lab Results  Component Value Date/Time   Cholesterol, total 175 09/11/2017 12:03 PM   Cholesterol (POC) 184 03/17/2015 11:23 AM   HDL Cholesterol 56 09/11/2017 12:03 PM   LDL, calculated 104 09/11/2017 12:03 PM   LDL Cholesterol (POC) 117 03/17/2015 11:23 AM   Triglyceride 74 09/11/2017 12:03 PM   Triglycerides (POC) 76 03/17/2015 11:23 AM     Lab Results  Component Value Date/Time   ALT (POC) 57 05/10/2011 04:31 PM   ALT (SGPT) 23 03/28/2017 12:23 PM   AST (SGOT) 24 03/28/2017 12:23 PM   AST (POC) 53 05/10/2011 04:31 PM   Alk. phosphatase 109 03/28/2017 12:23 PM   Bilirubin, total 0.5 03/28/2017 12:23 PM   Albumin 3.9 03/28/2017 12:23 PM   Protein, total 7.0 03/28/2017 12:23 PM   PLATELET 228 79/69/2653 12:25 PM            ROS    Physical Exam   Constitutional: She appears well-developed and well-nourished. Appears stated age   Cardiovascular: Normal rate, regular rhythm and normal heart sounds. Exam reveals no gallop and no friction rub. No murmur heard. Pulmonary/Chest: Effort normal and breath sounds normal. No respiratory distress. She has no wheezes. Abdominal: Soft. Bowel sounds are normal.   Musculoskeletal: She exhibits no edema. Neurological: She is alert. Psychiatric: She has a normal mood and affect. Nursing note and vitals reviewed. ASSESSMENT and PLAN  Diagnoses and all orders for this visit:    1. Controlled type 2 diabetes mellitus without complication, without long-term current use of insulin (HCC)  -     HEMOGLOBIN A1C WITH EAG  -     METABOLIC PANEL, COMPREHENSIVE   Controlled per home readings  2. Essential hypertension  -     METABOLIC PANEL, COMPREHENSIVE   controlled  3.  Pure hypercholesterolemia  -     LIPID PANEL  -     METABOLIC PANEL, COMPREHENSIVE   Stressed compliance with lipitor    Follow-up Disposition:  Return in about 6 months (around 6/14/2018) for dm-2 htn hld.

## 2018-01-23 ENCOUNTER — HOSPITAL ENCOUNTER (EMERGENCY)
Age: 68
Discharge: HOME | End: 2018-01-23
Attending: EMERGENCY MEDICINE
Payer: COMMERCIAL

## 2018-01-23 ENCOUNTER — APPOINTMENT (OUTPATIENT)
Dept: GENERAL RADIOLOGY | Age: 68
End: 2018-01-23
Attending: EMERGENCY MEDICINE
Payer: COMMERCIAL

## 2018-01-23 VITALS
DIASTOLIC BLOOD PRESSURE: 72 MMHG | TEMPERATURE: 100 F | SYSTOLIC BLOOD PRESSURE: 161 MMHG | WEIGHT: 215.17 LBS | OXYGEN SATURATION: 96 % | HEIGHT: 61 IN | HEART RATE: 111 BPM | BODY MASS INDEX: 40.62 KG/M2 | RESPIRATION RATE: 16 BRPM

## 2018-01-23 DIAGNOSIS — J45.41 MODERATE PERSISTENT ASTHMA WITH ACUTE EXACERBATION: ICD-10-CM

## 2018-01-23 DIAGNOSIS — J20.9 ACUTE BRONCHITIS, UNSPECIFIED ORGANISM: Primary | ICD-10-CM

## 2018-01-23 LAB
ANION GAP SERPL CALC-SCNC: 7 MMOL/L (ref 5–15)
BASOPHILS # BLD: 0 K/UL (ref 0–0.1)
BASOPHILS NFR BLD: 0 % (ref 0–1)
BUN SERPL-MCNC: 9 MG/DL (ref 6–20)
BUN/CREAT SERPL: 9 (ref 12–20)
CALCIUM SERPL-MCNC: 9.4 MG/DL (ref 8.5–10.1)
CHLORIDE SERPL-SCNC: 102 MMOL/L (ref 97–108)
CO2 SERPL-SCNC: 31 MMOL/L (ref 21–32)
CREAT SERPL-MCNC: 0.99 MG/DL (ref 0.55–1.02)
EOSINOPHIL # BLD: 0.2 K/UL (ref 0–0.4)
EOSINOPHIL NFR BLD: 2 % (ref 0–7)
ERYTHROCYTE [DISTWIDTH] IN BLOOD BY AUTOMATED COUNT: 13.6 % (ref 11.5–14.5)
FLUAV AG NPH QL IA: NEGATIVE
FLUBV AG NOSE QL IA: NEGATIVE
GLUCOSE SERPL-MCNC: 128 MG/DL (ref 65–100)
HCT VFR BLD AUTO: 40.7 % (ref 35–47)
HGB BLD-MCNC: 13.1 G/DL (ref 11.5–16)
LACTATE SERPL-SCNC: 1.2 MMOL/L (ref 0.4–2)
LYMPHOCYTES # BLD: 1 K/UL (ref 0.8–3.5)
LYMPHOCYTES NFR BLD: 12 % (ref 12–49)
MCH RBC QN AUTO: 29.1 PG (ref 26–34)
MCHC RBC AUTO-ENTMCNC: 32.2 G/DL (ref 30–36.5)
MCV RBC AUTO: 90.4 FL (ref 80–99)
MONOCYTES # BLD: 0.7 K/UL (ref 0–1)
MONOCYTES NFR BLD: 8 % (ref 5–13)
NEUTS SEG # BLD: 6.4 K/UL (ref 1.8–8)
NEUTS SEG NFR BLD: 78 % (ref 32–75)
PLATELET # BLD AUTO: 184 K/UL (ref 150–400)
POTASSIUM SERPL-SCNC: 3.8 MMOL/L (ref 3.5–5.1)
RBC # BLD AUTO: 4.5 M/UL (ref 3.8–5.2)
SODIUM SERPL-SCNC: 140 MMOL/L (ref 136–145)
WBC # BLD AUTO: 8.2 K/UL (ref 3.6–11)

## 2018-01-23 PROCEDURE — 80048 BASIC METABOLIC PNL TOTAL CA: CPT | Performed by: EMERGENCY MEDICINE

## 2018-01-23 PROCEDURE — 96375 TX/PRO/DX INJ NEW DRUG ADDON: CPT

## 2018-01-23 PROCEDURE — 71045 X-RAY EXAM CHEST 1 VIEW: CPT

## 2018-01-23 PROCEDURE — 74011250637 HC RX REV CODE- 250/637: Performed by: EMERGENCY MEDICINE

## 2018-01-23 PROCEDURE — 99284 EMERGENCY DEPT VISIT MOD MDM: CPT

## 2018-01-23 PROCEDURE — 77030029684 HC NEB SM VOL KT MONA -A

## 2018-01-23 PROCEDURE — 94640 AIRWAY INHALATION TREATMENT: CPT

## 2018-01-23 PROCEDURE — 36415 COLL VENOUS BLD VENIPUNCTURE: CPT | Performed by: EMERGENCY MEDICINE

## 2018-01-23 PROCEDURE — 87804 INFLUENZA ASSAY W/OPTIC: CPT | Performed by: EMERGENCY MEDICINE

## 2018-01-23 PROCEDURE — 74011000250 HC RX REV CODE- 250: Performed by: EMERGENCY MEDICINE

## 2018-01-23 PROCEDURE — 96366 THER/PROPH/DIAG IV INF ADDON: CPT

## 2018-01-23 PROCEDURE — 85025 COMPLETE CBC W/AUTO DIFF WBC: CPT | Performed by: EMERGENCY MEDICINE

## 2018-01-23 PROCEDURE — 96365 THER/PROPH/DIAG IV INF INIT: CPT

## 2018-01-23 PROCEDURE — 74011250636 HC RX REV CODE- 250/636: Performed by: EMERGENCY MEDICINE

## 2018-01-23 PROCEDURE — 83605 ASSAY OF LACTIC ACID: CPT | Performed by: EMERGENCY MEDICINE

## 2018-01-23 PROCEDURE — 93005 ELECTROCARDIOGRAM TRACING: CPT

## 2018-01-23 PROCEDURE — 87040 BLOOD CULTURE FOR BACTERIA: CPT | Performed by: EMERGENCY MEDICINE

## 2018-01-23 RX ORDER — BENZONATATE 100 MG/1
100 CAPSULE ORAL
Qty: 30 CAP | Refills: 0 | Status: SHIPPED | OUTPATIENT
Start: 2018-01-23 | End: 2018-01-30

## 2018-01-23 RX ORDER — IPRATROPIUM BROMIDE AND ALBUTEROL SULFATE 2.5; .5 MG/3ML; MG/3ML
3 SOLUTION RESPIRATORY (INHALATION)
Status: COMPLETED | OUTPATIENT
Start: 2018-01-23 | End: 2018-01-23

## 2018-01-23 RX ORDER — AZITHROMYCIN 250 MG/1
250 TABLET, FILM COATED ORAL DAILY
Qty: 4 TAB | Refills: 0 | Status: SHIPPED | OUTPATIENT
Start: 2018-01-23 | End: 2018-06-27 | Stop reason: ALTCHOICE

## 2018-01-23 RX ORDER — PREDNISONE 20 MG/1
40 TABLET ORAL DAILY
Qty: 10 TAB | Refills: 0 | Status: SHIPPED | OUTPATIENT
Start: 2018-01-23 | End: 2018-01-28

## 2018-01-23 RX ORDER — ACETAMINOPHEN 325 MG/1
650 TABLET ORAL
Status: COMPLETED | OUTPATIENT
Start: 2018-01-23 | End: 2018-01-23

## 2018-01-23 RX ADMIN — METHYLPREDNISOLONE SODIUM SUCCINATE 125 MG: 125 INJECTION, POWDER, FOR SOLUTION INTRAMUSCULAR; INTRAVENOUS at 16:58

## 2018-01-23 RX ADMIN — AZITHROMYCIN MONOHYDRATE 500 MG: 500 INJECTION, POWDER, LYOPHILIZED, FOR SOLUTION INTRAVENOUS at 18:10

## 2018-01-23 RX ADMIN — ACETAMINOPHEN 650 MG: 325 TABLET ORAL at 18:09

## 2018-01-23 RX ADMIN — SODIUM CHLORIDE 1000 ML: 900 INJECTION, SOLUTION INTRAVENOUS at 18:14

## 2018-01-23 RX ADMIN — IPRATROPIUM BROMIDE AND ALBUTEROL SULFATE 3 ML: .5; 3 SOLUTION RESPIRATORY (INHALATION) at 16:51

## 2018-01-23 RX ADMIN — IPRATROPIUM BROMIDE AND ALBUTEROL SULFATE 3 ML: .5; 3 SOLUTION RESPIRATORY (INHALATION) at 16:52

## 2018-01-24 LAB
ATRIAL RATE: 118 BPM
CALCULATED P AXIS, ECG09: 65 DEGREES
CALCULATED R AXIS, ECG10: -4 DEGREES
CALCULATED T AXIS, ECG11: 65 DEGREES
DIAGNOSIS, 93000: NORMAL
P-R INTERVAL, ECG05: 152 MS
Q-T INTERVAL, ECG07: 306 MS
QRS DURATION, ECG06: 74 MS
QTC CALCULATION (BEZET), ECG08: 428 MS
VENTRICULAR RATE, ECG03: 118 BPM

## 2018-01-24 NOTE — ED PROVIDER NOTES
EMERGENCY DEPARTMENT HISTORY AND PHYSICAL EXAM      Date: 1/23/2018  Patient Name: Rosanna Villalpando    History of Presenting Illness     Chief Complaint   Patient presents with    Shortness of Breath     Pt ambulatory to triage with c/o cough, dry; pt states SOB sometimes not associated with cough; pt with hx of bronchitis; pt denies CP    Cough     non-productive x 3 days       History Provided By: Patient    HPI: Rosanna Villalpando, 79 y.o. female with PMHx significant for HTN, asthma, DM, presents ambulatory to the ED with cc of worsening productive cough, maxillary HA, SOB and mild nausea x a few weeks. Pt reports that her SOB is exacerbated by laying flat. Initially, her inhaler provided some relief of her sxs, but it is no longer helping at all. Pt is also taking OTC cough drops with no relief. She specifically denies CP, vomiting, sore throat, diarrhea, fever, receiving the flu shot this year, recent sick contact, hx of CHF or other cardiac hx. PCP: Tyson Zambrano MD    There are no other complaints, changes, or physical findings at this time. Current Outpatient Prescriptions   Medication Sig Dispense Refill    azithromycin (ZITHROMAX Z-CONOR) 250 mg tablet Take 1 Tab by mouth daily. 4 Tab 0    predniSONE (DELTASONE) 20 mg tablet Take 2 Tabs by mouth daily for 5 days. With Breakfast 10 Tab 0    benzonatate (TESSALON PERLES) 100 mg capsule Take 1 Cap by mouth three (3) times daily as needed for Cough for up to 7 days. 30 Cap 0    atorvastatin (LIPITOR) 20 mg tablet Take 1 Tab by mouth daily. 90 Tab 3    metFORMIN (GLUCOPHAGE) 500 mg tablet Take 1 Tab by mouth two (2) times daily (with meals). 180 Tab 3    lisinopril-hydrochlorothiazide (PRINZIDE, ZESTORETIC) 20-25 mg per tablet Take 1 Tab by mouth daily. 90 Tab 3    Aspirin, Buffered 81 mg tab Take  by mouth.       albuterol (PROVENTIL VENTOLIN) 2.5 mg /3 mL (0.083 %) nebulizer solution 1.5 mL by Nebulization route every four (4) hours as needed for Wheezing. 1 Package 6    glucose blood VI test strips (TRUETRACK TEST) strip 1 Each by Does Not Apply route Daily (before breakfast). 1 Package 11    FA/MV,CA,FE,MIN/LYCOPENE/LUT (MULTIVITAL PO) Take  by mouth daily. Past History     Past Medical History:  Past Medical History:   Diagnosis Date    Asthma     Diabetes ([facility] [address]     Hypertension        Past Surgical History:  History reviewed. No pertinent surgical history. Family History:  Family History   Problem Relation Age of Onset    Diabetes Mother     High Cholesterol Mother     Hypertension Mother     Diabetes Sister     Hypertension Sister        Social History:  Social History   Substance Use Topics    Smoking status: Former Smoker     Packs/day: 0.50     Years: 30.00     Quit date: 1/1/2011    Smokeless tobacco: Never Used    Alcohol use No       Allergies:  No Known Allergies      Review of Systems   Review of Systems   Constitutional: Negative for chills and fever. HENT: Negative for sore throat. Respiratory: Positive for cough and shortness of breath. Cardiovascular: Negative for chest pain. Gastrointestinal: Positive for nausea (mild). Negative for constipation, diarrhea and vomiting. Neurological: Positive for headaches (maxiallary HA). Negative for weakness and numbness. All other systems reviewed and are negative. Physical Exam   Physical Exam   Constitutional: She is oriented to person, place, and time. She appears well-developed and well-nourished. HENT:   Head: Normocephalic and atraumatic. Sounds congested    Eyes: Conjunctivae and EOM are normal.   Neck: Normal range of motion. Neck supple. Cardiovascular: Regular rhythm. Tachycardia present. Pulmonary/Chest: No respiratory distress. She has wheezes (in/expiratory wheezes). Difficult time completing sentences   Poor air movement   Abdominal: Soft. She exhibits no distension. There is no tenderness. Musculoskeletal: Normal range of motion.  She exhibits no edema (no pitting edema). Neurological: She is alert and oriented to person, place, and time. Skin: Skin is warm and dry. Warm to touch   Psychiatric: She has a normal mood and affect. Nursing note and vitals reviewed. Diagnostic Study Results     Labs -     Recent Results (from the past 12 hour(s))   CBC WITH AUTOMATED DIFF    Collection Time: 01/23/18  4:17 PM   Result Value Ref Range    WBC 8.2 3.6 - 11.0 K/uL    RBC 4.50 3.80 - 5.20 M/uL    HGB 13.1 11.5 - 16.0 g/dL    HCT 40.7 35.0 - 47.0 %    MCV 90.4 80.0 - 99.0 FL    MCH 29.1 26.0 - 34.0 PG    MCHC 32.2 30.0 - 36.5 g/dL    RDW 13.6 11.5 - 14.5 %    PLATELET 180 554 - 439 K/uL    NEUTROPHILS 78 (H) 32 - 75 %    LYMPHOCYTES 12 12 - 49 %    MONOCYTES 8 5 - 13 %    EOSINOPHILS 2 0 - 7 %    BASOPHILS 0 0 - 1 %    ABS. NEUTROPHILS 6.4 1.8 - 8.0 K/UL    ABS. LYMPHOCYTES 1.0 0.8 - 3.5 K/UL    ABS. MONOCYTES 0.7 0.0 - 1.0 K/UL    ABS. EOSINOPHILS 0.2 0.0 - 0.4 K/UL    ABS.  BASOPHILS 0.0 0.0 - 0.1 K/UL   LACTIC ACID    Collection Time: 01/23/18  4:17 PM   Result Value Ref Range    Lactic acid 1.2 0.4 - 2.0 MMOL/L   METABOLIC PANEL, BASIC    Collection Time: 01/23/18  4:17 PM   Result Value Ref Range    Sodium 140 136 - 145 mmol/L    Potassium 3.8 3.5 - 5.1 mmol/L    Chloride 102 97 - 108 mmol/L    CO2 31 21 - 32 mmol/L    Anion gap 7 5 - 15 mmol/L    Glucose 128 (H) 65 - 100 mg/dL    BUN 9 6 - 20 MG/DL    Creatinine 0.99 0.55 - 1.02 MG/DL    BUN/Creatinine ratio 9 (L) 12 - 20      GFR est AA >60 >60 ml/min/1.73m2    GFR est non-AA 56 (L) >60 ml/min/1.73m2    Calcium 9.4 8.5 - 10.1 MG/DL   EKG, 12 LEAD, INITIAL    Collection Time: 01/23/18  4:26 PM   Result Value Ref Range    Ventricular Rate 118 BPM    Atrial Rate 118 BPM    P-R Interval 152 ms    QRS Duration 74 ms    Q-T Interval 306 ms    QTC Calculation (Bezet) 428 ms    Calculated P Axis 65 degrees    Calculated R Axis -4 degrees    Calculated T Axis 65 degrees    Diagnosis       Sinus tachycardia  Otherwise normal ECG  No previous ECGs available     INFLUENZA A & B AG (RAPID TEST)    Collection Time: 01/23/18  6:04 PM   Result Value Ref Range    Influenza A Antigen NEGATIVE  NEG      Influenza B Antigen NEGATIVE  NEG         Radiologic Studies -   CXR Results  (Last 48 hours)               01/23/18 1707  XR CHEST PORT Final result    Impression:  IMPRESSION: No acute cardiopulmonary disease. Narrative:  INDICATION: Sepsis, shortness of breath, cough. Portable AP semiupright view of the chest.       Direct comparison made to prior chest x-ray dated April 2013. Cardiomediastinal silhouette is stable. Lungs are clear bilaterally. Pleural   spaces are normal. Osseous structures are intact. Medical Decision Making   I am the first provider for this patient. I reviewed the vital signs, available nursing notes, past medical history, past surgical history, family history and social history. Vital Signs-Reviewed the patient's vital signs. Patient Vitals for the past 12 hrs:   Temp Pulse Resp BP SpO2   01/23/18 1940 - - - - 96 %   01/23/18 1930 - - - 161/72 96 %   01/23/18 1900 100 °F (37.8 °C) (!) 111 16 138/70 96 %   01/23/18 1706 - - - - 98 %   01/23/18 1600 (!) 100.9 °F (38.3 °C) (!) 122 18 194/86 94 %       Pulse Oximetry Analysis - 94% on RA    Cardiac Monitor:   Rate: 122 bpm  Rhythm: Sinus Tachycardia      EKG interpretation: (Preliminary) 1626  Rhythm: sinus tachycardia; and regular . Rate (approx.): 118 bpm; Axis: normal; FL interval: normal; QRS interval: normal ; ST/T wave: normal.  Written by KOSTAS John, as dictated by Jolie Zambrano M.D.. Records Reviewed: Nursing Notes and Old Medical Records    Provider Notes (Medical Decision Making): The patient complains of nasal congestion, rhinorrhea, and cough. DDx: viral URI, acute bronchitis, bacterial sinusitis vs. pharyngitis, migraine, flu.  Will get CXR to r/o PNA and treat symptoms. Most likely URI exacerbating her asthma. ED Course:   Initial assessment performed. The patients presenting problems have been discussed, and they are in agreement with the care plan formulated and outlined with them. I have encouraged them to ask questions as they arise throughout their visit. 5:33 PM  Updated pt about nl labs and CXR. Likely due to viral syndrome. Pt is still not moving air WNL after 1 nebulizer and steroids. Written by Meli Danielson ED Scribe as dictated by Laila Gallegos M.D.    6:05 PM  Ambulated without difficulty with O2 maintaining at 95%. Will d/c home. Written by Meli Danielson ED Scribe as dictated by Laila Gallegos M.D. Disposition:  DISCHARGE NOTE:  6:47 PM  Pt has been reexamined. Pt has no new complaints, changes, or physical findings. Care plan outlined and precautions discussed. All available results reviewed with pt. All medications reviewed with pt. All of pts questions and concerns addressed. Pt agrees to f/u as instructed and agrees to return to ED upon further deterioration. Pt is ready to go home. Written by Meli Danielson ED Scribe as dictated by Laila Gallegos M.D. PLAN:  1. Discharge Medication List as of 1/23/2018  6:06 PM      START taking these medications    Details   azithromycin (ZITHROMAX Z-CONOR) 250 mg tablet Take 1 Tab by mouth daily. , Normal, Disp-4 Tab, R-0      predniSONE (DELTASONE) 20 mg tablet Take 2 Tabs by mouth daily for 5 days. With Breakfast, Normal, Disp-10 Tab, R-0      benzonatate (TESSALON PERLES) 100 mg capsule Take 1 Cap by mouth three (3) times daily as needed for Cough for up to 7 days. , Normal, Disp-30 Cap, R-0         CONTINUE these medications which have NOT CHANGED    Details   atorvastatin (LIPITOR) 20 mg tablet Take 1 Tab by mouth daily. , Normal, Disp-90 Tab, R-3      metFORMIN (GLUCOPHAGE) 500 mg tablet Take 1 Tab by mouth two (2) times daily (with meals). , Normal, Disp-180 Tab, R-3 lisinopril-hydrochlorothiazide (PRINZIDE, ZESTORETIC) 20-25 mg per tablet Take 1 Tab by mouth daily. , Normal, Disp-90 Tab, R-3      Aspirin, Buffered 81 mg tab Take  by mouth., Historical Med      albuterol (PROVENTIL VENTOLIN) 2.5 mg /3 mL (0.083 %) nebulizer solution 1.5 mL by Nebulization route every four (4) hours as needed for Wheezing., No Print, Disp-1 Package, R-6      glucose blood VI test strips (TRUETRACK TEST) strip 1 Each by Does Not Apply route Daily (before breakfast). Normal, 1 Each, Disp-1 Package, R-11      FA/MV,CA,FE,MIN/LYCOPENE/LUT (MULTIVITAL PO) Oral, DAILY Starting 2/1/2010, Until Discontinued, Historical Med           2. Follow-up Information     Follow up With Details Comments [address] Maria MD  As needed [facility] [address] [place] [address]  [phone]          Return to ED if worse     Diagnosis     Clinical Impression:   1. Acute bronchitis, unspecified organism    2. Moderate persistent asthma with acute exacerbation        Attestations: This note is prepared by Miesha Aiken acting as scribe for Noam Mckinnon M.D. Noam Mckinnon M.D. : The scribe's documentation has been prepared under my direction and personally reviewed by me in its entirety. I confirm that the note above accurately reflects all work, treatment, procedures, and medical decision making performed by me.

## 2018-01-28 LAB
BACTERIA SPEC CULT: NORMAL
SERVICE CMNT-IMP: NORMAL

## 2018-01-29 RX ORDER — LISINOPRIL AND HYDROCHLOROTHIAZIDE 20; 25 MG/1; MG/1
1 TABLET ORAL DAILY
Qty: 90 TAB | Refills: 3 | Status: SHIPPED | OUTPATIENT
Start: 2018-01-29 | End: 2019-02-17 | Stop reason: SDUPTHER

## 2018-01-29 NOTE — TELEPHONE ENCOUNTER
Pt needs refill for Lisinopril sent to [address] [place] [address] on file.        Message received & copied from [facility] after closing on 1/26/18

## 2018-02-10 ENCOUNTER — HOSPITAL ENCOUNTER (OUTPATIENT)
Dept: MAMMOGRAPHY | Age: 68
Discharge: HOME | End: 2018-02-10
Attending: INTERNAL MEDICINE
Payer: COMMERCIAL

## 2018-02-10 DIAGNOSIS — Z12.31 VISIT FOR SCREENING MAMMOGRAM: ICD-10-CM

## 2018-02-10 PROCEDURE — 77067 SCR MAMMO BI INCL CAD: CPT

## 2018-06-27 ENCOUNTER — OFFICE VISIT (OUTPATIENT)
Dept: INTERNAL MEDICINE CLINIC | Age: 68
End: 2018-06-27

## 2018-06-27 VITALS
WEIGHT: 212 LBS | TEMPERATURE: 98 F | BODY MASS INDEX: 40.02 KG/M2 | DIASTOLIC BLOOD PRESSURE: 82 MMHG | HEIGHT: 61 IN | OXYGEN SATURATION: 97 % | SYSTOLIC BLOOD PRESSURE: 144 MMHG | HEART RATE: 79 BPM

## 2018-06-27 DIAGNOSIS — E78.00 PURE HYPERCHOLESTEROLEMIA: ICD-10-CM

## 2018-06-27 DIAGNOSIS — Z12.11 SCREEN FOR COLON CANCER: ICD-10-CM

## 2018-06-27 DIAGNOSIS — I10 ESSENTIAL HYPERTENSION: ICD-10-CM

## 2018-06-27 DIAGNOSIS — E11.9 CONTROLLED TYPE 2 DIABETES MELLITUS WITHOUT COMPLICATION, WITHOUT LONG-TERM CURRENT USE OF INSULIN (HCC): Primary | ICD-10-CM

## 2018-06-27 RX ORDER — ALBUTEROL SULFATE 90 UG/1
1 AEROSOL, METERED RESPIRATORY (INHALATION)
Qty: 1 INHALER | Refills: 6 | Status: SHIPPED | OUTPATIENT
Start: 2018-06-27 | End: 2019-07-21 | Stop reason: SDUPTHER

## 2018-06-27 NOTE — PROGRESS NOTES
HISTORY OF PRESENT ILLNESS  Bridgette Kay is a 79 y.o. female. HPI     F/u DM-2 htn hld   Last a1c 6.5 ldl 104  9/17    fsbs around 120 in morning ,lower later in the day  exercise 3d per week  On a good diet  No sxs of cp sob neuropathy      Last OV:  fsbs in am avg about 120 per pt  Started to exercise with a  once per week and goes 3 other days per week and feels well  Did not fill rx for breo and did not see cardiologist for ROLON   No cp sxs  No sxs of neuropathy in feet per pt  lipitor dose was increased to 20 mg every day last visit but pt only takes about 3d /week        Patient Active Problem List    Diagnosis Date Noted    COPD (chronic obstructive pulmonary disease) ([facility] [address] 06/30/2015    Type II or unspecified type diabetes mellitus without mention of complication, not stated as uncontrolled 09/03/2013    Obesity 05/10/2011    HTN (hypertension) 11/08/2010    Asthma 02/21/2010    Pure hypercholesterolemia 02/21/2010    Colon polyp 02/21/2010    Fibrocystic breast 02/21/2010    Fibroid uterus 02/21/2010    Colitis 02/21/2010     Current Outpatient Prescriptions   Medication Sig Dispense Refill    lisinopril-hydroCHLOROthiazide (PRINZIDE, ZESTORETIC) 20-25 mg per tablet Take 1 Tab by mouth daily. 90 Tab 3    atorvastatin (LIPITOR) 20 mg tablet Take 1 Tab by mouth daily. 90 Tab 3    metFORMIN (GLUCOPHAGE) 500 mg tablet Take 1 Tab by mouth two (2) times daily (with meals). 180 Tab 3    Aspirin, Buffered 81 mg tab Take  by mouth.  glucose blood VI test strips (TRUETRACK TEST) strip 1 Each by Does Not Apply route Daily (before breakfast). 1 Package 11    FA/MV,CA,FE,MIN/LYCOPENE/LUT (MULTIVITAL PO) Take  by mouth daily.  albuterol (PROVENTIL VENTOLIN) 2.5 mg /3 mL (0.083 %) nebulizer solution 1.5 mL by Nebulization route every four (4) hours as needed for Wheezing.  1 Package 6     No Known Allergies   Lab Results  Component Value Date/Time   Hemoglobin A1c 6.5 (H) 09/11/2017 12:03 PM   Hemoglobin A1c 6.8 (H) 03/28/2017 12:23 PM   Hemoglobin A1c 7.2 (H) 11/23/2016 12:25 PM   Glucose 128 (H) 01/23/2018 04:17 PM   Glucose POC 88 11/23/2012 03:22 PM   Microalb/Creat ratio (ug/mg creat.) 3.1 09/11/2017 12:03 PM   LDL, calculated 104 (H) 09/11/2017 12:03 PM   Creatinine 0.99 01/23/2018 04:17 PM      Lab Results  Component Value Date/Time   Cholesterol, total 175 09/11/2017 12:03 PM   Cholesterol (POC) 184 03/17/2015 11:23 AM   HDL Cholesterol 56 09/11/2017 12:03 PM   LDL, calculated 104 (H) 09/11/2017 12:03 PM   LDL Cholesterol (POC) 117 03/17/2015 11:23 AM   Triglyceride 74 09/11/2017 12:03 PM   Triglycerides (POC) 76 03/17/2015 11:23 AM     Lab Results  Component Value Date/Time   ALT (POC) 57 (A) 05/10/2011 04:31 PM   ALT (SGPT) 23 03/28/2017 12:23 PM   AST (SGOT) 24 03/28/2017 12:23 PM   AST (POC) 53 (A) 05/10/2011 04:31 PM   Alk. phosphatase 109 03/28/2017 12:23 PM   Bilirubin, total 0.5 03/28/2017 12:23 PM   Albumin 3.9 03/28/2017 12:23 PM   Protein, total 7.0 03/28/2017 12:23 PM   PLATELET 461 36/56/8835 04:17 PM       Lab Results  Component Value Date/Time   GFR est non-AA 56 (L) 01/23/2018 04:17 PM   GFR est AA >60 01/23/2018 04:17 PM   Creatinine 0.99 01/23/2018 04:17 PM   BUN 9 01/23/2018 04:17 PM   Sodium 140 01/23/2018 04:17 PM   Potassium 3.8 01/23/2018 04:17 PM   Chloride 102 01/23/2018 04:17 PM   CO2 31 01/23/2018 04:17 PM        ROS    Physical Exam   Constitutional: She appears well-developed and well-nourished. Appears stated age   Cardiovascular: Normal rate, regular rhythm and normal heart sounds. Exam reveals no gallop and no friction rub. No murmur heard. Pulmonary/Chest: Effort normal and breath sounds normal. No respiratory distress. She has no wheezes. Abdominal: Soft. Bowel sounds are normal.   Musculoskeletal: She exhibits no edema. Neurological: She is alert. Psychiatric: She has a normal mood and affect. Nursing note and vitals reviewed.       ASSESSMENT and PLAN  Diagnoses and all orders for this visit:    1. Controlled type 2 diabetes mellitus without complication, without long-term current use of insulin (HCC)  -     HEMOGLOBIN A1C WITH EAG  -     METABOLIC PANEL, COMPREHENSIVE   Controlled per home readings on metformin, diet , exercise   Weight reduction needed-discussed    2. Essential hypertension  -     METABOLIC PANEL, COMPREHENSIVE   Low sodium diet   bp monitoring    3. Pure hypercholesterolemia  -     METABOLIC PANEL, COMPREHENSIVE  -     LIPID PANEL   Taking statin 3d per week    4. COPD   Mild   Refill albuterol MDI    5. Colon screen   Refer GI MD  Other orders  -     albuterol (PROVENTIL HFA, VENTOLIN HFA, PROAIR HFA) 90 mcg/actuation inhaler; Take 1 Puff by inhalation every four (4) hours as needed for Wheezing. Follow-up Disposition:  Return in about 6 months (around 12/27/2018) for dm=-2 htn hld.

## 2018-06-27 NOTE — PROGRESS NOTES
Chief Complaint   Patient presents with    Diabetes     6 month follow up    Hypertension     6 month follow up    Cholesterol Problem     6 month follow up    Labs     Fasting

## 2018-06-27 NOTE — MR AVS SNAPSHOT
[address] [place] [phone] Patient: Herbert Lange MRN:  [id number] Visit Information Date & Time Provider Department Dept. Phone Encounter #  
 6/27/2018  9:45 AM Neo Cordero, [address] [phone] [id number] Follow-up Instructions Return in about 6 months (around 12/27/2018) for dm=-2 htn hld. Upcoming Health Maintenance Date Due  
 GLAUCOMA SCREENING Q2Y 11/30/2015 HEMOGLOBIN A1C Q6M 3/11/2018 FOOT EXAM Q1 3/28/2018 COLONOSCOPY 9/12/2018 Influenza Age 5 to Adult 8/1/2018 EYE EXAM RETINAL OR DILATED Q1 8/22/2018 MICROALBUMIN Q1 9/11/2018 LIPID PANEL Q1 9/11/2018 BREAST CANCER SCRN MAMMOGRAM 2/10/2020 DTaP/Tdap/Td series (2 - Td) 6/30/2025 Allergies as of 6/27/2018  Review Complete On: 2/10/2018 By: Hanh Comer, RT No Known Allergies Current Immunizations  Reviewed on 1/6/2014 Name Date Influenza Vaccine Split 11/15/2011  3:42 PM  
 Pneumococcal Conjugate (PCV-13) 8/22/2017 Pneumococcal Polysaccharide (PPSV-23) 1/25/2016 Tdap 6/30/2015 Not reviewed this visit You Were Diagnosed With   
  
 Codes Comments Controlled type 2 diabetes mellitus without complication, without long-term current use of insulin ([facility]    -  Primary ICD-10-CM: E11.9 ICD-9-CM: 250.00 Essential hypertension     ICD-10-CM: I10 
ICD-9-CM: 401.9 Pure hypercholesterolemia     ICD-10-CM: E78.00 ICD-9-CM: 272.0 Vitals BP Pulse Temp Height(growth percentile) Weight(growth percentile) SpO2  
 144/82 (BP 1 Location: Left arm, BP Patient Position: Sitting) 79 98 °F (36.7 °C) (Oral) 5' 1\" (1.549 m) 212 lb (96.2 kg) 97% BMI OB Status Smoking Status 40.06 kg/m2 Postmenopausal Former Smoker BMI and BSA Data Body Mass Index Body Surface Area 40.06 kg/m 2 2.03 m 2 Preferred Pharmacy Pharmacy Name Phone [facility]  [facility] [phone] Your Updated Medication List  
  
   
This list is accurate as of 6/27/18 10:18 AM.  Always use your most recent med list.  
  
  
  
  
 * albuterol 2.5 mg /3 mL (0.083 %) nebulizer solution Commonly known as:  PROVENTIL VENTOLIN  
1.5 mL by Nebulization route every four (4) hours as needed for Wheezing. * albuterol 90 mcg/actuation inhaler Commonly known as:  PROVENTIL HFA, VENTOLIN HFA, PROAIR HFA Take 1 Puff by inhalation every four (4) hours as needed for Wheezing. aspirin, buffered 81 mg Tab Take  by mouth. atorvastatin 20 mg tablet Commonly known as:  LIPITOR Take 1 Tab by mouth daily. glucose blood VI test strips strip Commonly known as:  TRUETRACK TEST  
1 Each by Does Not Apply route Daily (before breakfast). lisinopril-hydroCHLOROthiazide 20-25 mg per tablet Commonly known as:  Coye Perry Take 1 Tab by mouth daily. metFORMIN 500 mg tablet Commonly known as:  GLUCOPHAGE Take 1 Tab by mouth two (2) times daily (with meals). MULTIVITAL PO Take  by mouth daily. * Notice: This list has 2 medication(s) that are the same as other medications prescribed for you. Read the directions carefully, and ask your doctor or other care provider to review them with you. Prescriptions Sent to Pharmacy Refills  
 albuterol (PROVENTIL HFA, VENTOLIN HFA, PROAIR HFA) 90 mcg/actuation inhaler 6 Sig: Take 1 Puff by inhalation every four (4) hours as needed for Wheezing. Class: Normal  
 Pharmacy: [facility] [employer] [facility], [address] [place] [address] Ph #: [phone] Route: Inhalation We Performed the Following HEMOGLOBIN A1C WITH EAG [45603 CPT(R)] LIPID PANEL [38604 CPT(R)] METABOLIC PANEL, COMPREHENSIVE [64047 CPT(R)] Follow-up Instructions Return in about 6 months (around 12/27/2018) for dm=-2 htn hld. Introducing [facility]! Edy Pizarro introduces [employer] patient portal. Now you can access parts of your medical record, email your doctor's office, and request medication refills online. 1. In your internet browser, go to [email or web address] 2. Click on the First Time User? Click Here link in the Sign In box. You will see the New Member Sign Up page. 3. Enter your [employer] Access Code exactly as it appears below. You will not need to use this code after youve completed the sign-up process. If you do not sign up before the expiration date, you must request a new code. · [employer] Access Code: [email or web address] Expires: 9/25/2018 10:18 AM 
 
4. Enter the last four digits of your Social Security Number (xxxx) and Date of Birth (mm/dd/yyyy) as indicated and click Submit. You will be taken to the next sign-up page. 5. Create a [employer] ID. This will be your [employer] login ID and cannot be changed, so think of one that is secure and easy to remember. 6. Create a [employer] password. You can change your password at any time. 7. Enter your Password Reset Question and Answer. This can be used at a later time if you forget your password. 8. Enter your e-mail address. You will receive e-mail notification when new information is available in [address] 9. Click Sign Up. You can now view and download portions of your medical record. 10. Click the Download Summary menu link to download a portable copy of your medical information. If you have questions, please visit the Frequently Asked Questions section of the [employer] website. Remember, [employer] is NOT to be used for urgent needs. For medical emergencies, dial 911. Now available from your iPhone and Android! Please provide this summary of care documentation to your next provider. Your primary care clinician is listed as Cira GÓMEZ. If you have any questions after today's visit, please call [phone].

## 2018-06-28 LAB
ALBUMIN SERPL-MCNC: 4.1 G/DL (ref 3.6–4.8)
ALBUMIN/GLOB SERPL: 1.2 {RATIO} (ref 1.2–2.2)
ALP SERPL-CCNC: 114 IU/L (ref 39–117)
ALT SERPL-CCNC: 15 IU/L (ref 0–32)
AST SERPL-CCNC: 22 IU/L (ref 0–40)
BILIRUB SERPL-MCNC: 0.7 MG/DL (ref 0–1.2)
BUN SERPL-MCNC: 12 MG/DL (ref 8–27)
BUN/CREAT SERPL: 15 (ref 12–28)
CALCIUM SERPL-MCNC: 9.9 MG/DL (ref 8.7–10.3)
CHLORIDE SERPL-SCNC: 102 MMOL/L (ref 96–106)
CHOLEST SERPL-MCNC: 172 MG/DL (ref 100–199)
CO2 SERPL-SCNC: 28 MMOL/L (ref 20–29)
CREAT SERPL-MCNC: 0.81 MG/DL (ref 0.57–1)
EST. AVERAGE GLUCOSE BLD GHB EST-MCNC: 146 MG/DL
GLOBULIN SER CALC-MCNC: 3.4 G/DL (ref 1.5–4.5)
GLUCOSE SERPL-MCNC: 118 MG/DL (ref 65–99)
HBA1C MFR BLD: 6.7 % (ref 4.8–5.6)
HDLC SERPL-MCNC: 60 MG/DL
LDLC SERPL CALC-MCNC: 95 MG/DL (ref 0–99)
POTASSIUM SERPL-SCNC: 4.6 MMOL/L (ref 3.5–5.2)
PROT SERPL-MCNC: 7.5 G/DL (ref 6–8.5)
SODIUM SERPL-SCNC: 145 MMOL/L (ref 134–144)
TRIGL SERPL-MCNC: 85 MG/DL (ref 0–149)
VLDLC SERPL CALC-MCNC: 17 MG/DL (ref 5–40)

## 2018-12-31 ENCOUNTER — OFFICE VISIT (OUTPATIENT)
Dept: INTERNAL MEDICINE CLINIC | Age: 68
End: 2018-12-31

## 2018-12-31 VITALS
BODY MASS INDEX: 39.65 KG/M2 | DIASTOLIC BLOOD PRESSURE: 74 MMHG | HEIGHT: 61 IN | SYSTOLIC BLOOD PRESSURE: 138 MMHG | OXYGEN SATURATION: 97 % | HEART RATE: 76 BPM | WEIGHT: 210 LBS | TEMPERATURE: 98.1 F

## 2018-12-31 DIAGNOSIS — E11.9 CONTROLLED TYPE 2 DIABETES MELLITUS WITHOUT COMPLICATION, WITHOUT LONG-TERM CURRENT USE OF INSULIN (HCC): Primary | ICD-10-CM

## 2018-12-31 DIAGNOSIS — E78.00 PURE HYPERCHOLESTEROLEMIA: ICD-10-CM

## 2018-12-31 DIAGNOSIS — E66.01 MORBID OBESITY (HCC): ICD-10-CM

## 2018-12-31 DIAGNOSIS — I10 ESSENTIAL HYPERTENSION: ICD-10-CM

## 2018-12-31 NOTE — PROGRESS NOTES
HISTORY OF PRESENT ILLNESS Adiel Kelly is a 76 y.o. female. HPI  
  
F/u DM-2 htn hld obesity bmi 40 Last a1c 6.7 LDL 95 
fsbs in morning around 120-130 or less Takes lipitor only about once a week--side effects of achiness Not exercising much recently No cp sob or sxs of neuropathy 
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 
  
fsbs around 120 in morning ,lower later in the day 
exercise 3d per week On a good diet No sxs of cp sob neuropathy 
  
  
Last OV: 
fsbs in am avg about 120 per pt Started to exercise with a  once per week and goes 3 other days per week and feels well Did not fill rx for breo and did not see cardiologist for ROLON No cp sxs No sxs of neuropathy in feet per pt 
lipitor dose was increased to 20 mg every day last visit but pt only takes about 3d /week 
   
  
 
 
 
 
 
Patient Active Problem List  
 Diagnosis Date Noted  [facility]ch[facility] disease) [facility] 06/30/2015  Type II or unspecified type diabetes mellitus without mention of complication, not stated as uncontrolled 09/03/2013  Obesity 05/10/2011  
 HTN (hypertension) 11/08/2010  Asthma 02/21/2010  Pure hypercholesterolemia 02/21/2010  Colon polyp 02/21/2010  Fibrocystic breast 02/21/2010  Fibroid uterus 02/21/2010  Colitis 02/21/2010 Current Outpatient Medications Medication Sig Dispense Refill  metFORMIN (GLUCOPHAGE) 500 mg tablet TAKE 1 TABLET BY MOUTH TWICE DAILY WITH MEALS 180 Tab 3  
 albuterol (PROVENTIL HFA, VENTOLIN HFA, PROAIR HFA) 90 mcg/actuation inhaler Take 1 Puff by inhalation every four (4) hours as needed for Wheezing. 1 Inhaler 6  
 lisinopril-hydroCHLOROthiazide (PRINZIDE, ZESTORETIC) 20-25 mg per tablet Take 1 Tab by mouth daily. 90 Tab 3  
 atorvastatin (LIPITOR) 20 mg tablet Take 1 Tab by mouth daily. 90 Tab 3  Aspirin, Buffered 81 mg tab Take  by mouth.  albuterol (PROVENTIL VENTOLIN) 2.5 mg /3 mL (0.083 %) nebulizer solution 1.5 mL by Nebulization route every four (4) hours as needed for Wheezing. 1 Package 6  
 glucose blood VI test strips (TRUETRACK TEST) strip 1 Each by Does Not Apply route Daily (before breakfast). 1 Package 11  
 FA/MV,CA,FE,MIN/LYCOPENE/LUT (MULTIVITAL PO) Take  by mouth daily. No Known Allergies Lab Results Component Value Date/Time Hemoglobin A1c 6.7 (H) 06/27/2018 10:24 AM  
 Hemoglobin A1c 6.5 (H) 09/11/2017 12:03 PM  
 Hemoglobin A1c 6.8 (H) 03/28/2017 12:23 PM  
 Glucose 118 (H) 06/27/2018 10:24 AM  
 Glucose POC 88 11/23/2012 03:22 PM  
 Microalb/Creat ratio (ug/mg creat.) 3.1 09/11/2017 12:03 PM  
 LDL, calculated 95 06/27/2018 10:24 AM  
 Creatinine 0.81 06/27/2018 10:24 AM  
  
Lab Results Component Value Date/Time Cholesterol, total 172 06/27/2018 10:24 AM  
 Cholesterol (POC) 184 03/17/2015 11:23 AM  
 HDL Cholesterol 60 06/27/2018 10:24 AM  
 LDL, calculated 95 06/27/2018 10:24 AM  
 LDL Cholesterol (POC) 117 03/17/2015 11:23 AM  
 Triglyceride 85 06/27/2018 10:24 AM  
 Triglycerides (POC) 76 03/17/2015 11:23 AM  
 
Lab Results Component Value Date/Time GFR est non-AA 75 06/27/2018 10:24 AM  
 GFR est AA 87 06/27/2018 10:24 AM  
 Creatinine 0.81 06/27/2018 10:24 AM  
 BUN 12 06/27/2018 10:24 AM  
 Sodium 145 (H) 06/27/2018 10:24 AM  
 Potassium 4.6 06/27/2018 10:24 AM  
 Chloride 102 06/27/2018 10:24 AM  
 CO2 28 06/27/2018 10:24 AM  
  
ROS Physical Exam  
Constitutional: She appears well-developed and well-nourished. Appears stated age, obese, nad Cardiovascular: Normal rate, regular rhythm and normal heart sounds. Exam reveals no gallop and no friction rub. No murmur heard. Pulmonary/Chest: Effort normal and breath sounds normal. No respiratory distress. She has no wheezes. Abdominal: Soft. Bowel sounds are normal.  
Musculoskeletal: She exhibits no edema. Neurological: She is alert. Psychiatric: She has a normal mood and affect. Nursing note and vitals reviewed. ASSESSMENT and PLAN Diagnoses and all orders for this visit: 1. Controlled type 2 diabetes mellitus without complication, without long-term current use of insulin (HCC) 
-     HEMOGLOBIN A1C WITH EAG 
-     METABOLIC PANEL, COMPREHENSIVE 
-     MICROALBUMIN, UR, RAND W/ MICROALB/CREAT RATIO Controlled per home readings 2. Essential hypertension 
-     CBC W/O DIFF 
-     METABOLIC PANEL, COMPREHENSIVE 
 controlled 3. Pure hypercholesterolemia -     METABOLIC PANEL, COMPREHENSIVE 
-     TSH 3RD GENERATION Probably needs to take lipitor at least 2-3 d per week--discussed 4. Morbid obesity (HCC) 
-     TSH 3RD GENERATION I have reviewed/discussed the above normal BMI with the patient. I have recommended the following interventions: dietary management education, guidance, and counseling and encourage exercise . Mireya Patel Follow-up Disposition: 
Return in about 6 months (around 6/30/2019) for dm=-2 htn hld weight .

## 2019-01-01 LAB
ALBUMIN SERPL-MCNC: 4.1 G/DL (ref 3.6–4.8)
ALBUMIN/CREAT UR: <5.6 MG/G CREAT (ref 0–30)
ALBUMIN/GLOB SERPL: 1.3 {RATIO} (ref 1.2–2.2)
ALP SERPL-CCNC: 106 IU/L (ref 39–117)
ALT SERPL-CCNC: 14 IU/L (ref 0–32)
AST SERPL-CCNC: 19 IU/L (ref 0–40)
BILIRUB SERPL-MCNC: 0.5 MG/DL (ref 0–1.2)
BUN SERPL-MCNC: 12 MG/DL (ref 8–27)
BUN/CREAT SERPL: 16 (ref 12–28)
CALCIUM SERPL-MCNC: 9.3 MG/DL (ref 8.7–10.3)
CHLORIDE SERPL-SCNC: 103 MMOL/L (ref 96–106)
CO2 SERPL-SCNC: 26 MMOL/L (ref 20–29)
CREAT SERPL-MCNC: 0.73 MG/DL (ref 0.57–1)
CREAT UR-MCNC: 53.9 MG/DL
ERYTHROCYTE [DISTWIDTH] IN BLOOD BY AUTOMATED COUNT: 14.2 % (ref 12.3–15.4)
EST. AVERAGE GLUCOSE BLD GHB EST-MCNC: 154 MG/DL
GLOBULIN SER CALC-MCNC: 3.1 G/DL (ref 1.5–4.5)
GLUCOSE SERPL-MCNC: 94 MG/DL (ref 65–99)
HBA1C MFR BLD: 7 % (ref 4.8–5.6)
HCT VFR BLD AUTO: 39.7 % (ref 34–46.6)
HGB BLD-MCNC: 13.1 G/DL (ref 11.1–15.9)
MCH RBC QN AUTO: 29.3 PG (ref 26.6–33)
MCHC RBC AUTO-ENTMCNC: 33 G/DL (ref 31.5–35.7)
MCV RBC AUTO: 89 FL (ref 79–97)
MICROALBUMIN UR-MCNC: <3 UG/ML
PLATELET # BLD AUTO: 211 X10E3/UL (ref 150–379)
POTASSIUM SERPL-SCNC: 4 MMOL/L (ref 3.5–5.2)
PROT SERPL-MCNC: 7.2 G/DL (ref 6–8.5)
RBC # BLD AUTO: 4.47 X10E6/UL (ref 3.77–5.28)
SODIUM SERPL-SCNC: 144 MMOL/L (ref 134–144)
TSH SERPL DL<=0.005 MIU/L-ACNC: 2.67 UIU/ML (ref 0.45–4.5)
WBC # BLD AUTO: 5.5 X10E3/UL (ref 3.4–10.8)

## 2019-01-02 LAB
CHOLEST SERPL-MCNC: 200 MG/DL (ref 100–199)
HDLC SERPL-MCNC: 54 MG/DL
LDLC SERPL CALC-MCNC: 128 MG/DL (ref 0–99)
SPECIMEN STATUS REPORT, ROLRST: NORMAL
TRIGL SERPL-MCNC: 89 MG/DL (ref 0–149)
VLDLC SERPL CALC-MCNC: 18 MG/DL (ref 5–40)

## 2019-04-15 ENCOUNTER — TELEPHONE (OUTPATIENT)
Dept: INTERNAL MEDICINE CLINIC | Age: 69
End: 2019-04-15

## 2019-04-15 NOTE — TELEPHONE ENCOUNTER
#[phone] pt fell at work on 4-12-19 and hit her chest.  Pt went to [facility] and was told ice it and take ibuprofen. That is now working at all she states. Pt wants to see Dr. Marcelo Small as soon as possible. Pt was advised we do not do workman's comp in our office. Please call.

## 2019-04-15 NOTE — TELEPHONE ENCOUNTER
Called, spoke to pt. Two identifiers confirmed. Appointment scheduled for Wednesday @ 115. Pt verbalized understanding of information discussed w/ no further questions at this time.

## 2019-06-29 NOTE — PROGRESS NOTES
HISTORY OF PRESENT ILLNESS  Christina Velazquez is a 76 y.o. female. 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        Patient Active Problem List    Diagnosis Date Noted    Severe obesity ([facility] 2018    COPD (chronic obstructive pulmonary disease) ([facility] 2015    Type II or unspecified type diabetes mellitus without mention of complication, not stated as uncontrolled 2013    Obesity 05/10/2011    HTN (hypertension) 2010    Asthma 2010    Pure hypercholesterolemia 2010    Colon polyp 2010    Fibrocystic breast 2010    Fibroid uterus 2010    Colitis 2010     Current Outpatient Medications   Medication Sig Dispense Refill    lisinopril-hydroCHLOROthiazide (PRINZIDE, ZESTORETIC) 20-25 mg per tablet TAKE 1 TABLET BY MOUTH DAILY 90 Tab 3    metFORMIN (GLUCOPHAGE) 500 mg tablet TAKE 1 TABLET BY MOUTH TWICE DAILY WITH MEALS 180 Tab 3    albuterol (PROVENTIL HFA, VENTOLIN HFA, PROAIR HFA) 90 mcg/actuation inhaler Take 1 Puff by inhalation every four (4) hours as needed for Wheezing. 1 Inhaler 6    atorvastatin (LIPITOR) 20 mg tablet Take 1 Tab by mouth daily. 90 Tab 3    Aspirin, Buffered 81 mg tab Take  by mouth.  albuterol (PROVENTIL VENTOLIN) 2.5 mg /3 mL (0.083 %) nebulizer solution 1.5 mL by Nebulization route every four (4) hours as needed for Wheezing.  1 Package 6    glucose blood VI test strips (TRUETRACK TEST) strip 1 Each by Does Not Apply route Daily (before breakfast). 1 Package 11    FA/MV,CA,FE,MIN/LYCOPENE/LUT (MULTIVITAL PO) Take  by mouth daily. No Known Allergies   Lab Results   Component Value Date/Time    WBC 5.5 12/31/2018 12:05 PM    HGB 13.1 12/31/2018 12:05 PM    HCT 39.7 12/31/2018 12:05 PM    PLATELET 891 21/07/3867 12:05 PM    MCV 89 12/31/2018 12:05 PM     Lab Results   Component Value Date/Time    Hemoglobin A1c 7.0 (H) 12/31/2018 12:05 PM    Hemoglobin A1c 6.7 (H) 06/27/2018 10:24 AM    Hemoglobin A1c 6.5 (H) 09/11/2017 12:03 PM    Glucose 94 12/31/2018 12:05 PM    Glucose POC 88 11/23/2012 03:22 PM    Microalb/Creat ratio (ug/mg creat.) <5.6 12/31/2018 12:05 PM    LDL, calculated 128 (H) 12/31/2018 12:05 PM    Creatinine 0.73 12/31/2018 12:05 PM      Lab Results   Component Value Date/Time    Cholesterol, total 200 (H) 12/31/2018 12:05 PM    Cholesterol (POC) 184 03/17/2015 11:23 AM    HDL Cholesterol 54 12/31/2018 12:05 PM    LDL, calculated 128 (H) 12/31/2018 12:05 PM    LDL Cholesterol (POC) 117 03/17/2015 11:23 AM    Triglyceride 89 12/31/2018 12:05 PM    Triglycerides (POC) 76 03/17/2015 11:23 AM     Lab Results   Component Value Date/Time    GFR est non-AA 85 12/31/2018 12:05 PM    GFR est AA 98 12/31/2018 12:05 PM    Creatinine 0.73 12/31/2018 12:05 PM    BUN 12 12/31/2018 12:05 PM    Sodium 144 12/31/2018 12:05 PM    Potassium 4.0 12/31/2018 12:05 PM    Chloride 103 12/31/2018 12:05 PM    CO2 26 12/31/2018 12:05 PM        ROS    Physical Exam   Constitutional: She appears well-developed and well-nourished. Appears stated age, obese, nad   Cardiovascular: Normal rate, regular rhythm and normal heart sounds. Exam reveals no gallop and no friction rub. No murmur heard. Pulmonary/Chest: Effort normal and breath sounds normal. No respiratory distress. She has no wheezes. Abdominal: Soft. Bowel sounds are normal.   Musculoskeletal: She exhibits no edema. Neurological: She is alert.         Diabetic foot exam performed by Sindhu Summers MD       Measurement  Response Nurse Comment Physician Comment  Monofilament  R - normal sensation with micro filament  L - normal sensation with micro filament    Pulse DP R - 2+ (normal)  L - 2+ (normal)    Pulse TP R - 2+ (normal)  L - 2+ (normal)    Structural deformity R - None  L - None    Skin Integrity / Deformity R - None  L - None       Reviewed by:         Psychiatric: She has a normal mood and affect. Nursing note and vitals reviewed. ASSESSMENT and PLAN  Diagnoses and all orders for this visit:    1. Breast screening  -     [facility] MAMMO BI SCREENING INCL CAD; Future    2. Controlled type 2 diabetes mellitus without complication, without long-term current use of insulin (HCC)  -     AMB POC HEMOGLOBIN A1C 7.- work on diet and exercise--discussed  -      DIABETES FOOT EXAM  -     METABOLIC PANEL, COMPREHENSIVE    3. Essential hypertension  -     METABOLIC PANEL, COMPREHENSIVE   controlled  4. Pure hypercholesterolemia  -     LIPID PANEL  -     METABOLIC PANEL, COMPREHENSIVE   Advised to take lipitor qd  5. Osteopenia, unspecified location  -     DEXA BONE DENSITY STUDY AXIAL; Future   Start calcium bid and MVI qd  6. ROLON (dyspnea on exertion)  -     REFERRAL TO CARDIOLOGY   Several risk factors for CAD/CHF      Follow-up and Dispositions    · Return in about 6 months (around 1/1/2020) for dm-2 htn hld.

## 2019-07-01 ENCOUNTER — OFFICE VISIT (OUTPATIENT)
Dept: INTERNAL MEDICINE CLINIC | Age: 69
End: 2019-07-01

## 2019-07-01 VITALS
DIASTOLIC BLOOD PRESSURE: 76 MMHG | RESPIRATION RATE: 16 BRPM | OXYGEN SATURATION: 96 % | SYSTOLIC BLOOD PRESSURE: 133 MMHG | TEMPERATURE: 98.1 F | HEIGHT: 61 IN | BODY MASS INDEX: 39.84 KG/M2 | WEIGHT: 211 LBS | HEART RATE: 78 BPM

## 2019-07-01 DIAGNOSIS — E78.00 PURE HYPERCHOLESTEROLEMIA: ICD-10-CM

## 2019-07-01 DIAGNOSIS — E11.9 CONTROLLED TYPE 2 DIABETES MELLITUS WITHOUT COMPLICATION, WITHOUT LONG-TERM CURRENT USE OF INSULIN (HCC): ICD-10-CM

## 2019-07-01 DIAGNOSIS — M85.80 OSTEOPENIA, UNSPECIFIED LOCATION: ICD-10-CM

## 2019-07-01 DIAGNOSIS — I10 ESSENTIAL HYPERTENSION: ICD-10-CM

## 2019-07-01 DIAGNOSIS — Z12.39 BREAST SCREENING: Primary | ICD-10-CM

## 2019-07-01 DIAGNOSIS — R06.09 DOE (DYSPNEA ON EXERTION): ICD-10-CM

## 2019-07-01 LAB — HBA1C MFR BLD HPLC: 7 % (ref 4.8–5.6)

## 2019-07-01 NOTE — PROGRESS NOTES
Chief Complaint   Patient presents with    Diabetes     6 month follow up    Diabetic Foot Exam     Routine    Labs     Non- Fasting, POC A1C 7.0 on today's visit

## 2019-07-01 NOTE — PATIENT INSTRUCTIONS
Office Policies Phone calls/patient messages: Please allow up to 24 hours for someone in the office to contact you about your call or message. Be mindful your provider may be out of the office or your message may require further review. We encourage you to use [facility] for your messages as this is a faster, more efficient way to communicate with our office Medication Refills: 
         
Prescription medications require 48-72 business hours to process. We encourage you to use [facility] for your refills. For controlled medications: Please allow 72 business hours to process. Certain medications may require you to  a written prescription at our office. NO narcotic/controlled medications will be prescribed after 4pm Monday through Friday or on weekends Form/Paperwork Completion: 
         
Please note a $25 fee may incur for all paperwork for completed by our providers. We ask that you allow 7-10 business days. Pre-payment is due prior to picking up/faxing the completed form. You may also download your forms to [facility] to have your doctor print off.

## 2019-07-19 ENCOUNTER — HOSPITAL ENCOUNTER (OUTPATIENT)
Dept: BONE DENSITY | Age: 69
Discharge: HOME | End: 2019-07-19
Attending: INTERNAL MEDICINE
Payer: COMMERCIAL

## 2019-07-19 ENCOUNTER — HOSPITAL ENCOUNTER (OUTPATIENT)
Dept: MAMMOGRAPHY | Age: 69
Discharge: HOME | End: 2019-07-19
Attending: INTERNAL MEDICINE
Payer: COMMERCIAL

## 2019-07-19 DIAGNOSIS — M85.80 OSTEOPENIA, UNSPECIFIED LOCATION: ICD-10-CM

## 2019-07-19 DIAGNOSIS — Z12.39 BREAST SCREENING: ICD-10-CM

## 2019-07-19 PROCEDURE — 77067 SCR MAMMO BI INCL CAD: CPT

## 2019-07-19 PROCEDURE — 77080 DXA BONE DENSITY AXIAL: CPT

## 2019-07-31 ENCOUNTER — OFFICE VISIT (OUTPATIENT)
Dept: CARDIOLOGY CLINIC | Age: 69
End: 2019-07-31

## 2019-07-31 VITALS
SYSTOLIC BLOOD PRESSURE: 136 MMHG | WEIGHT: 213 LBS | BODY MASS INDEX: 40.22 KG/M2 | DIASTOLIC BLOOD PRESSURE: 82 MMHG | RESPIRATION RATE: 18 BRPM | HEIGHT: 61 IN | OXYGEN SATURATION: 99 % | HEART RATE: 98 BPM

## 2019-07-31 DIAGNOSIS — I10 ESSENTIAL HYPERTENSION: ICD-10-CM

## 2019-07-31 DIAGNOSIS — E78.00 PURE HYPERCHOLESTEROLEMIA: ICD-10-CM

## 2019-07-31 DIAGNOSIS — R06.02 SOBOE (SHORTNESS OF BREATH ON EXERTION): Primary | ICD-10-CM

## 2019-07-31 NOTE — PROGRESS NOTES
Mat Pardo, [facility]    Subjective/HPI:     Ms. Mary Myers is a 76 y.o. female is here to establish care. She has a PMHx of asthma, DM2, HTN and HLD. She notes complaints of shortness of breath that has been ongoing for a few months. She notes symptoms with exertion while walking up stairs. She denies associated symptoms of chest pain, dizziness, orthopnea, PND or edema. She feels short of breath on days after she has eaten potato chips. She does feel better after taking her morning dose of HCTZ. She used to exercise regularly, but stopped 6 months ago after sustaining a muscle injury to the right shoulder and neck. She was a previous smoker, smoking from age 15 to 62 about 1/2 ppd. PCP Provider  Chucho Malloy MD  Past Medical History:   Diagnosis Date    Asthma     Diabetes [facility]     Hypertension     Menopause       History reviewed. No pertinent surgical history.   Family History   Problem Relation Age of Onset    Diabetes Mother     High Cholesterol Mother     Hypertension Mother     Diabetes Sister     Hypertension Sister      Social History     Socioeconomic History    Marital status:      Spouse name: Not on file    Number of children: Not on file    Years of education: Not on file    Highest education level: Not on file   Occupational History    Not on file   Social Needs    Financial resource strain: Not on file    Food insecurity:     Worry: Not on file     Inability: Not on file    Transportation needs:     Medical: Not on file     Non-medical: Not on file   Tobacco Use    Smoking status: Former Smoker     Packs/day: 0.50     Years: 30.00     Pack years: 15.00     Last attempt to quit: 2011     Years since quittin.5    Smokeless tobacco: Never Used   Substance and Sexual Activity    Alcohol use: No    Drug use: Yes     Types: Prescription, OTC    Sexual activity: Not Currently   Lifestyle    Physical activity:     Days per week: Not on file     Minutes per session: Not on file    Stress: Not on file   Relationships    Social connections:     Talks on phone: Not on file     Gets together: Not on file     Attends [employer] service: Not on file     Active member of club or organization: Not on file     Attends meetings of clubs or organizations: Not on file     Relationship status: Not on file    Intimate partner violence:     Fear of current or ex partner: Not on file     Emotionally abused: Not on file     Physically abused: Not on file     Forced sexual activity: Not on file   Other Topics Concern    Not on file   Social History Narrative    Not on file       No Known Allergies     Current Outpatient Medications   Medication Sig    calcium-cholecalciferol, d3, (CALCIUM 600 + D) 600-125 mg-unit tab Take 1 Tab by mouth two (2) times a day.  albuterol (PROVENTIL HFA, VENTOLIN HFA, PROAIR HFA) 90 mcg/actuation inhaler INHALE 1 PUFF BY MOUTH EVERY 4 HOURS AS NEEDED FOR WHEEZING    lisinopril-hydroCHLOROthiazide (PRINZIDE, ZESTORETIC) 20-25 mg per tablet TAKE 1 TABLET BY MOUTH DAILY    metFORMIN (GLUCOPHAGE) 500 mg tablet TAKE 1 TABLET BY MOUTH TWICE DAILY WITH MEALS    atorvastatin (LIPITOR) 20 mg tablet Take 1 Tab by mouth daily.  Aspirin, Buffered 81 mg tab Take  by mouth.  albuterol (PROVENTIL VENTOLIN) 2.5 mg /3 mL (0.083 %) nebulizer solution 1.5 mL by Nebulization route every four (4) hours as needed for Wheezing.  glucose blood VI test strips (TRUETRACK TEST) strip 1 Each by Does Not Apply route Daily (before breakfast).  FA/MV,CA,FE,MIN/LYCOPENE/LUT (MULTIVITAL PO) Take  by mouth daily. No current facility-administered medications for this visit. I have reviewed the problem list, allergy list, medical history, family, social history and medications. Review of Symptoms:    Review of Systems   Constitutional: Negative for chills, fever and weight loss. HENT: Negative for nosebleeds.     Eyes: Negative for blurred vision and double vision. Respiratory: Negative for cough, shortness of breath and wheezing. Cardiovascular: Negative for chest pain, palpitations, orthopnea, leg swelling and PND. Gastrointestinal: Negative for abdominal pain, blood in stool, diarrhea, nausea and vomiting. Musculoskeletal: Negative for joint pain. Skin: Negative for rash. Neurological: Negative for dizziness, tingling and loss of consciousness. Endo/Heme/Allergies: Does not bruise/bleed easily. Physical Exam:      General: Well developed, in no acute distress, cooperative and alert  HEENT: No carotid bruits, no JVD, trach is midline. Neck Supple, PEERL, EOM intact. Heart:  reg rate and rhythm; normal S1/S2; no murmurs, gallops or rubs. Respiratory: Clear bilaterally x 4, scattered inspiratory and expiratory wheezing throughout  Abdomen:   Soft, non-tender, no distention, no masses. + BS. Extremities:  Normal cap refill, no cyanosis, atraumatic. No edema. Neuro: A&Ox3, speech clear, gait stable. Skin: Skin color is normal. No rashes or lesions.  Non diaphoretic  Vascular: 2+ pulses symmetric in all extremities    Vitals:    07/31/19 0946 07/31/19 0950   BP: 136/82 136/82   Pulse: 98    Resp: 18    SpO2: 99%    Weight: 213 lb (96.6 kg)    Height: 5' 1\" (1.549 m)        Cardiographics    ECG: sinus rhythm  Results for orders placed or performed during the hospital encounter of 01/23/18   EKG, 12 LEAD, INITIAL   Result Value Ref Range    Ventricular Rate 118 BPM    Atrial Rate 118 BPM    P-R Interval 152 ms    QRS Duration 74 ms    Q-T Interval 306 ms    QTC Calculation (Bezet) 428 ms    Calculated P Axis 65 degrees    Calculated R Axis -4 degrees    Calculated T Axis 65 degrees    Diagnosis       Sinus tachycardia  No previous ECGs available  Confirmed by Main Ortega ([id number]) on 1/24/2018 1:17:32 PM         Cardiology Labs:  Lab Results   Component Value Date/Time    Cholesterol, total 200 (H) 12/31/2018 12:05 PM    HDL Cholesterol 54 12/31/2018 12:05 PM    LDL, calculated 128 (H) 12/31/2018 12:05 PM    Triglyceride 89 12/31/2018 12:05 PM       Lab Results   Component Value Date/Time    Sodium 144 12/31/2018 12:05 PM    Potassium 4.0 12/31/2018 12:05 PM    Chloride 103 12/31/2018 12:05 PM    CO2 26 12/31/2018 12:05 PM    Anion gap 7 01/23/2018 04:17 PM    Glucose 94 12/31/2018 12:05 PM    BUN 12 12/31/2018 12:05 PM    Creatinine 0.73 12/31/2018 12:05 PM    BUN/Creatinine ratio 16 12/31/2018 12:05 PM    GFR est AA 98 12/31/2018 12:05 PM    GFR est non-AA 85 12/31/2018 12:05 PM    Calcium 9.3 12/31/2018 12:05 PM    Bilirubin, total 0.5 12/31/2018 12:05 PM    AST (SGOT) 19 12/31/2018 12:05 PM    Alk. phosphatase 106 12/31/2018 12:05 PM    Protein, total 7.2 12/31/2018 12:05 PM    Albumin 4.1 12/31/2018 12:05 PM    A-G Ratio 1.3 12/31/2018 12:05 PM    ALT (SGPT) 14 12/31/2018 12:05 PM           Assessment:     Assessment:       ICD-10-CM ICD-9-CM    1. SOBOE (shortness of breath on exertion) R06.02 786.05    2. Essential hypertension I10 401.9 AMB POC EKG ROUTINE W/ 12 LEADS, INTER & REP   3. Pure hypercholesterolemia E78.00 272.0         Plan:     1. SOBOE (shortness of breath on exertion)  New symptoms of shortness of breath, which started shortly after stopping exercise due to muscle injury  Has risk factors for coronary disease, will obtain stress echo and echocardiogram to r/o ischemia  If negative, would consider pulmonary workup given wheezing heard on exam today. 2. Essential hypertension  BP controlled. Continue anti-hypertensive therapy and low sodium diet    3. Pure hypercholesterolemia  Continue statin therapy and low fat, low cholesterol diet  Lipids managed by PCP -- due for repeat lipids in 7/2019    F/u after testing complete    Jeaneth Shearer NP       [facility]    7/31/2019         Patient seen, examined by me personally. Plan discussed as detailed. Agree with note as outlined by  NP.  I confirm findings in history and physical exam. No additional findings noted. Agree with plan as outlined above.      Terrell Bullock MD

## 2019-07-31 NOTE — PROGRESS NOTES
Verified patient with 2 identifiers   Reviewed record in preparation for visit and obtained necessary documentation. Chief Complaint   Patient presents with    New Patient     referred by Dr William Waters for dyspnea upon exertion     Shortness of Breath     for a couple of months - zoe when going up stairs - never happened when she went to the gym - stopped going 6 months ago      1. Have you been to the ER, urgent care clinic since your last visit? Hospitalized since your last visit? No     2. Have you seen or consulted any other health care providers outside of the [address] [place] [address] since your last visit? Include any pap smears or colon screening.  No

## 2019-09-27 LAB
ALBUMIN SERPL-MCNC: 4.2 G/DL (ref 3.6–4.8)
ALBUMIN/GLOB SERPL: 1.3 {RATIO} (ref 1.2–2.2)
ALP SERPL-CCNC: 115 IU/L (ref 39–117)
ALT SERPL-CCNC: 16 IU/L (ref 0–32)
AST SERPL-CCNC: 19 IU/L (ref 0–40)
BILIRUB SERPL-MCNC: 0.6 MG/DL (ref 0–1.2)
BUN SERPL-MCNC: 16 MG/DL (ref 8–27)
BUN/CREAT SERPL: 19 (ref 12–28)
CALCIUM SERPL-MCNC: 9.3 MG/DL (ref 8.7–10.3)
CHLORIDE SERPL-SCNC: 102 MMOL/L (ref 96–106)
CHOLEST SERPL-MCNC: 153 MG/DL (ref 100–199)
CO2 SERPL-SCNC: 28 MMOL/L (ref 20–29)
CREAT SERPL-MCNC: 0.85 MG/DL (ref 0.57–1)
GLOBULIN SER CALC-MCNC: 3.2 G/DL (ref 1.5–4.5)
GLUCOSE SERPL-MCNC: 113 MG/DL (ref 65–99)
HDLC SERPL-MCNC: 51 MG/DL
LDLC SERPL CALC-MCNC: 87 MG/DL (ref 0–99)
POTASSIUM SERPL-SCNC: 3.9 MMOL/L (ref 3.5–5.2)
PROT SERPL-MCNC: 7.4 G/DL (ref 6–8.5)
SODIUM SERPL-SCNC: 145 MMOL/L (ref 134–144)
TRIGL SERPL-MCNC: 74 MG/DL (ref 0–149)
VLDLC SERPL CALC-MCNC: 15 MG/DL (ref 5–40)

## 2019-10-14 RX ORDER — ATORVASTATIN CALCIUM 20 MG/1
20 TABLET, FILM COATED ORAL DAILY
Qty: 90 TAB | Refills: 3 | Status: SHIPPED | OUTPATIENT
Start: 2019-10-14 | End: 2021-01-04 | Stop reason: SDUPTHER

## 2019-10-14 NOTE — TELEPHONE ENCOUNTER
PCP: Jessica Powers MD    Last appt: 9/26/2019  Future Appointments   Date Time Provider Stacia Baez   1/8/2020 11:45 AM Jessica Powers MD [facility]       Requested Prescriptions     Pending Prescriptions Disp Refills    atorvastatin (LIPITOR) 20 mg tablet 90 Tab 3     Sig: Take 1 Tab by mouth daily.

## 2020-01-08 ENCOUNTER — OFFICE VISIT (OUTPATIENT)
Dept: INTERNAL MEDICINE CLINIC | Age: 70
End: 2020-01-08

## 2020-01-08 VITALS
WEIGHT: 215 LBS | BODY MASS INDEX: 40.59 KG/M2 | TEMPERATURE: 98.2 F | HEIGHT: 61 IN | HEART RATE: 76 BPM | SYSTOLIC BLOOD PRESSURE: 110 MMHG | DIASTOLIC BLOOD PRESSURE: 72 MMHG | OXYGEN SATURATION: 98 % | RESPIRATION RATE: 16 BRPM

## 2020-01-08 DIAGNOSIS — I10 ESSENTIAL HYPERTENSION: ICD-10-CM

## 2020-01-08 DIAGNOSIS — E78.00 PURE HYPERCHOLESTEROLEMIA: ICD-10-CM

## 2020-01-08 DIAGNOSIS — E11.9 CONTROLLED TYPE 2 DIABETES MELLITUS WITHOUT COMPLICATION, WITHOUT LONG-TERM CURRENT USE OF INSULIN (HCC): Primary | ICD-10-CM

## 2020-01-08 NOTE — PROGRESS NOTES
HISTORY OF PRESENT ILLNESS  Eder Alcaraz is a 71 y.o. female. HPI   Here in 6 month f/u     F/u DM-2 htn hld obesity bmi 40   last a1c 7.0 LDL 87     Had normal echo and stress echo for ROLON with cardiologist since last OV  fsbs  Around 120 or less per, sometimes < 80  Takes statin 1/2 tab 3 times/week  Still works parttime  No sxs of neuropathy     Last OV    Last a1c 7.0   Had been taking lipitor only once per week due to achiness- or feeling anxious  fsbs around 120 in mornings  She reports her younger sister  recnetly from CAD or CHF  Pt has some ROLON but no CP  Denies any symptoms of neuropathy in her feet      Patient Active Problem List    Diagnosis Date Noted    Severe obesity ([facility] 2018    COPD (chronic obstructive pulmonary disease) ([facility] 2015    Type II or unspecified type diabetes mellitus without mention of complication, not stated as uncontrolled 2013    Obesity 05/10/2011    HTN (hypertension) 2010    Asthma 2010    Pure hypercholesterolemia 2010    Colon polyp 2010    Fibrocystic breast 2010    Fibroid uterus 2010    Colitis 2010     Current Outpatient Medications   Medication Sig Dispense Refill    metFORMIN (GLUCOPHAGE) 500 mg tablet TAKE 1 TABLET BY MOUTH TWICE DAILY WITH MEALS 180 Tab 3    atorvastatin (LIPITOR) 20 mg tablet Take 1 Tab by mouth daily. 90 Tab 3    calcium-cholecalciferol, d3, (CALCIUM 600 + D) 600-125 mg-unit tab Take 1 Tab by mouth two (2) times a day.  albuterol (PROVENTIL HFA, VENTOLIN HFA, PROAIR HFA) 90 mcg/actuation inhaler INHALE 1 PUFF BY MOUTH EVERY 4 HOURS AS NEEDED FOR WHEEZING 18 g 11    lisinopril-hydroCHLOROthiazide (PRINZIDE, ZESTORETIC) 20-25 mg per tablet TAKE 1 TABLET BY MOUTH DAILY 90 Tab 3    Aspirin, Buffered 81 mg tab Take  by mouth.       albuterol (PROVENTIL VENTOLIN) 2.5 mg /3 mL (0.083 %) nebulizer solution 1.5 mL by Nebulization route every four (4) hours as needed for Wheezing. 1 Package 6    glucose blood VI test strips (TRUETRACK TEST) strip 1 Each by Does Not Apply route Daily (before breakfast). 1 Package 11    FA/MV,CA,FE,MIN/LYCOPENE/LUT (MULTIVITAL PO) Take  by mouth daily. No Known Allergies  Social History     Tobacco Use    Smoking status: Former Smoker     Packs/day: 0.50     Years: 30.00     Pack years: 15.00     Last attempt to quit: 2011     Years since quittin.0    Smokeless tobacco: Never Used   Substance Use Topics    Alcohol use: No      Lab Results   Component Value Date/Time    WBC 5.5 2018 12:05 PM    HGB 13.1 2018 12:05 PM    HCT 39.7 2018 12:05 PM    PLATELET 811  12:05 PM    MCV 89 2018 12:05 PM     Lab Results   Component Value Date/Time    Hemoglobin A1c 7.0 (H) 2018 12:05 PM    Hemoglobin A1c 6.7 (H) 2018 10:24 AM    Hemoglobin A1c 6.5 (H) 2017 12:03 PM    Glucose 113 (H) 2019 10:14 AM    Glucose POC 88 2012 03:22 PM    Microalb/Creat ratio (ug/mg creat.) <5.6 2018 12:05 PM    LDL, calculated 87 2019 10:14 AM    Creatinine 0.85 2019 10:14 AM      Lab Results   Component Value Date/Time    Cholesterol, total 153 2019 10:14 AM    Cholesterol (POC) 184 2015 11:23 AM    HDL Cholesterol 51 2019 10:14 AM    LDL, calculated 87 2019 10:14 AM    LDL Cholesterol (POC) 117 2015 11:23 AM    Triglyceride 74 2019 10:14 AM    Triglycerides (POC) 76 2015 11:23 AM     Lab Results   Component Value Date/Time    GFR est non-AA 71 2019 10:14 AM    GFR est AA 81 2019 10:14 AM    Creatinine 0.85 2019 10:14 AM    BUN 16 2019 10:14 AM    Sodium 145 (H) 2019 10:14 AM    Potassium 3.9 2019 10:14 AM    Chloride 102 2019 10:14 AM    CO2 28 2019 10:14 AM        ROS    Physical Exam  Vitals signs and nursing note reviewed. Constitutional:       Appearance: She is well-developed. Comments: Appears stated age   Cardiovascular:      Rate and Rhythm: Normal rate and regular rhythm. Heart sounds: Normal heart sounds. No murmur. No friction rub. No gallop. Pulmonary:      Effort: Pulmonary effort is normal. No respiratory distress. Breath sounds: Normal breath sounds. No wheezing. Abdominal:      General: Bowel sounds are normal.      Palpations: Abdomen is soft. Neurological:      Mental Status: She is alert. ASSESSMENT and PLAN  Diagnoses and all orders for this visit:    1. Controlled type 2 diabetes mellitus without complication, without long-term current use of insulin (HCC)  -     HEMOGLOBIN A1C WITH EAG  -     MICROALBUMIN, UR, RAND W/ MICROALB/CREAT RATIO  -     METABOLIC PANEL, COMPREHENSIVE   Controlled per home readings  2. Essential hypertension  -     METABOLIC PANEL, COMPREHENSIVE   controlled  3. Pure hypercholesterolemia  -     METABOLIC PANEL, COMPREHENSIVE  -     LIPID PANEL   On statin -goal LDL<100    Follow-up and Dispositions    · Return in about 6 months (around 7/8/2020) for dm-2 htn hld.

## 2020-01-08 NOTE — PATIENT INSTRUCTIONS
Office Policies    Phone calls/patient messages:            Please allow up to 24 hours for someone in the office to contact you about your call or message. Be mindful your provider may be out of the office or your message may require further review. We encourage you to use [employer] for your messages as this is a faster, more efficient way to communicate with our office                         Medication Refills:            Prescription medications require 48-72 business hours to process. We encourage you to use [employer] for your refills. For controlled medications: Please allow 72 business hours to process. Certain medications may require you to  a written prescription at our office. NO narcotic/controlled medications will be prescribed after 4pm Monday through Friday or on weekends              Form/Paperwork Completion:            Please note a $25 fee may incur for all paperwork for completed by our providers. We ask that you allow 7-10 business days. Pre-payment is due prior to picking up/faxing the completed form. You may also download your forms to [employer] to have your doctor print off.

## 2020-01-29 RX ORDER — LISINOPRIL AND HYDROCHLOROTHIAZIDE 20; 25 MG/1; MG/1
TABLET ORAL
Qty: 90 TAB | Refills: 3 | Status: SHIPPED | OUTPATIENT
Start: 2020-01-29 | End: 2020-07-08 | Stop reason: SDUPTHER

## 2020-03-24 ENCOUNTER — TELEPHONE (OUTPATIENT)
Dept: INTERNAL MEDICINE CLINIC | Age: 70
End: 2020-03-24

## 2020-03-24 NOTE — LETTER
NOTIFICATION RETURN TO WORK / SCHOOL 
 
3/25/2020 8:44 AM 
 
Ms. Rishabh Burrell [address] [facility] [address] To Whom It May Concern: 
 
Rishabh Burrell is currently under the care of [facility]. She will return to work/school on: 03/23/2020 If there are questions or concerns please have the patient contact our office.  
 
 
 
Sincerely, 
 
 
Chico Lind MD

## 2020-03-24 NOTE — TELEPHONE ENCOUNTER
#[phone] pt states she was out of work all of last week due to her asthma. She wasn't feeling well. She took all treatments and stayed inside at home. Pt states her employer is requesting a letter from pcp stating this. Please call pt when this is done and how she can get this. Please call pt or leave a vm if needed.

## 2020-03-25 NOTE — TELEPHONE ENCOUNTER
Called, spoke to pt. Two identifiers confirmed. Pt needs a letter excusing her from work last week. Pt returned to work 3/23. Notified pt letter will be ready to be picked up today. Pt verbalized understanding of information discussed w/ no further questions at this time.

## 2020-07-08 ENCOUNTER — VIRTUAL VISIT (OUTPATIENT)
Dept: INTERNAL MEDICINE CLINIC | Age: 70
End: 2020-07-08

## 2020-07-08 DIAGNOSIS — Z12.39 BREAST SCREENING: Primary | ICD-10-CM

## 2020-07-08 DIAGNOSIS — E78.00 PURE HYPERCHOLESTEROLEMIA: ICD-10-CM

## 2020-07-08 DIAGNOSIS — E11.9 CONTROLLED TYPE 2 DIABETES MELLITUS WITHOUT COMPLICATION, WITHOUT LONG-TERM CURRENT USE OF INSULIN (HCC): ICD-10-CM

## 2020-07-08 DIAGNOSIS — I10 ESSENTIAL HYPERTENSION: ICD-10-CM

## 2020-07-08 DIAGNOSIS — J45.20 MILD INTERMITTENT ASTHMA WITHOUT COMPLICATION: ICD-10-CM

## 2020-07-08 RX ORDER — LISINOPRIL AND HYDROCHLOROTHIAZIDE 20; 25 MG/1; MG/1
TABLET ORAL
Qty: 90 TAB | Refills: 3 | Status: SHIPPED | OUTPATIENT
Start: 2020-07-08 | End: 2021-07-06 | Stop reason: SDUPTHER

## 2020-07-08 RX ORDER — ALBUTEROL SULFATE 0.83 MG/ML
1.25 SOLUTION RESPIRATORY (INHALATION)
Qty: 30 NEBULE | Refills: 5 | Status: SHIPPED | OUTPATIENT
Start: 2020-07-08 | End: 2021-07-06 | Stop reason: SDUPTHER

## 2020-07-08 NOTE — PROGRESS NOTES
HISTORY OF PRESENT ILLNESS  Tonny Mcintosh is a 71 y.o. female. HPI   Tonny Mcintosh is a 71 y.o. female being evaluated by a Virtual Visit (video visit) encounter to address concerns as mentioned above. A caregiver was present when appropriate. Due to this being a TeleHealth encounter (During QEHLD-01 public health emergency), evaluation of the following organ systems was limited: Vitals/Constitutional/EENT/Resp/CV/GI//MS/Neuro/Skin/Heme-Lymph-Imm. Pursuant to the emergency declaration under the [facility], [address] [place] [address] authority and the [employer] Act, this Virtual Visit was conducted with patient's (and/or legal guardian's) consent, to reduce the risk of exposure to COVID-19 and provide necessary medical care. Services were provided through a video synchronous discussion virtually to substitute for in-person encounter. --Calderon Orellana MD on 7/8/2020 at 11:32 AM    An electronic signature was used to authenticate this note.       F/u DM-2 htn hld obesity bmi 40  Last a1c 7.0 ldl 87  fsbs-123 this morning.  . Almost always <140  No cp or sob per pt  No neuropathy  Walks for exercise  Still works 4 d week   requests refill of ab neb for wheezes  bp wnl at home  Weight stable    Last OV   last a1c 7.0 LDL 87     Had normal echo and stress echo for ROLON with cardiologist since last OV  fsbs  Around 120 or less per, sometimes < 90  Takes statin 1/2 tab 3 times/week  Still works parttime  No sxs of neuropathy          Patient Active Problem List    Diagnosis Date Noted    Severe obesity ([address] 12/31/2018    COPD (chronic obstructive pulmonary disease) ([address] 06/30/2015    Type II or unspecified type diabetes mellitus without mention of complication, not stated as uncontrolled 09/03/2013    Obesity 05/10/2011    HTN (hypertension) 11/08/2010    Asthma 02/21/2010    Pure hypercholesterolemia 02/21/2010    Colon polyp 2010    Fibrocystic breast 2010    Fibroid uterus 2010    Colitis 2010     Current Outpatient Medications   Medication Sig Dispense Refill    albuterol (PROVENTIL VENTOLIN) 2.5 mg /3 mL (0.083 %) nebu 1.5 mL by Nebulization route every four (4) hours as needed for Wheezing. 30 Nebule 5    metFORMIN (GLUCOPHAGE) 500 mg tablet TAKE 1 TABLET BY MOUTH TWICE DAILY WITH MEALS 180 Tab 3    atorvastatin (LIPITOR) 20 mg tablet Take 1 Tab by mouth daily. 90 Tab 3    calcium-cholecalciferol, d3, (CALCIUM 600 + D) 600-125 mg-unit tab Take 1 Tab by mouth two (2) times a day.  albuterol (PROVENTIL HFA, VENTOLIN HFA, PROAIR HFA) 90 mcg/actuation inhaler INHALE 1 PUFF BY MOUTH EVERY 4 HOURS AS NEEDED FOR WHEEZING 18 g 11    Aspirin, Buffered 81 mg tab Take  by mouth.  glucose blood VI test strips (TRUETRACK TEST) strip 1 Each by Does Not Apply route Daily (before breakfast). 1 Package 11    FA/MV,CA,FE,MIN/LYCOPENE/LUT (MULTIVITAL PO) Take  by mouth daily.       lisinopril-hydroCHLOROthiazide (PRINZIDE, ZESTORETIC) 20-25 mg per tablet TAKE 1 TABLET BY MOUTH DAILY 90 Tab 3     No Known Allergies  Social History     Tobacco Use    Smoking status: Former Smoker     Packs/day: 0.50     Years: 30.00     Pack years: 15.00     Last attempt to quit: 2011     Years since quittin.5    Smokeless tobacco: Never Used   Substance Use Topics    Alcohol use: No      Lab Results   Component Value Date/Time    WBC 5.5 2018 12:05 PM    HGB 13.1 2018 12:05 PM    HCT 39.7 2018 12:05 PM    PLATELET 874  12:05 PM    MCV 89 2018 12:05 PM     Lab Results   Component Value Date/Time    Hemoglobin A1c 7.0 (H) 2018 12:05 PM    Hemoglobin A1c 6.7 (H) 2018 10:24 AM    Hemoglobin A1c 6.5 (H) 2017 12:03 PM    Glucose 113 (H) 2019 10:14 AM    Glucose POC 88 2012 03:22 PM    Microalb/Creat ratio (ug/mg creat.) <5.6 2018 12:05 PM    LDL, calculated 87 09/26/2019 10:14 AM    Creatinine 0.85 09/26/2019 10:14 AM      Lab Results   Component Value Date/Time    Cholesterol, total 153 09/26/2019 10:14 AM    Cholesterol (POC) 184 03/17/2015 11:23 AM    HDL Cholesterol 51 09/26/2019 10:14 AM    LDL, calculated 87 09/26/2019 10:14 AM    LDL Cholesterol (POC) 117 03/17/2015 11:23 AM    Triglyceride 74 09/26/2019 10:14 AM    Triglycerides (POC) 76 03/17/2015 11:23 AM     Lab Results   Component Value Date/Time    GFR est non-AA 71 09/26/2019 10:14 AM    GFR est AA 81 09/26/2019 10:14 AM    Creatinine 0.85 09/26/2019 10:14 AM    BUN 16 09/26/2019 10:14 AM    Sodium 145 (H) 09/26/2019 10:14 AM    Potassium 3.9 09/26/2019 10:14 AM    Chloride 102 09/26/2019 10:14 AM    CO2 28 09/26/2019 10:14 AM        ROS    Physical Exam  Constitutional:       Appearance: Normal appearance. She is obese. Pulmonary:      Effort: Pulmonary effort is normal.   Neurological:      Mental Status: She is alert. ASSESSMENT and PLAN  Diagnoses and all orders for this visit:    1. Breast screening  -     [facility] MAMMO BI SCREENING INCL CAD; Future    2. Dm-2    Controlled per home readings   A1c, microalbumin   Work on diet and weight    Pt will schedule eye exam    3. HTN   Controlled    4. HLD   On statin   Lipid panel    5. Asthma   Mild-int   Refill neb soln  6. Obesity   I have reviewed/discussed the above normal BMI with the patient. I have recommended the following interventions: dietary management education, guidance, and counseling and encourage exercise . Tyler Knight Other orders  -     albuterol (PROVENTIL VENTOLIN) 2.5 mg /3 mL (0.083 %) nebu; 1.5 mL by Nebulization route every four (4) hours as needed for Wheezing.

## 2020-07-08 NOTE — PATIENT INSTRUCTIONS
This is an established visit conducted via telemedicine. The patient has been instructed that this meets HIPAA criteria and acknowledges and agrees to this method of visitation. Nalini Joseph LPN 
20/80/60 
07:93 AM 
Chief Complaint Patient presents with  Cholesterol Problem 6 month follow up  Hypertension 6 month follow up  COPD  
  6 month follow up

## 2020-07-08 NOTE — TELEPHONE ENCOUNTER
PCP: Ester Moreno MD    Last appt: 1/8/2020  Future Appointments   Date Time Provider Stacia Sasha   7/8/2020 11:45 AM Ester Moreno MD [facility] 87       Requested Prescriptions     Pending Prescriptions Disp Refills    lisinopril-hydroCHLOROthiazide (PRINZIDE, ZESTORETIC) 20-25 mg per tablet 90 Tab 3

## 2020-07-13 ENCOUNTER — TELEPHONE (OUTPATIENT)
Dept: INTERNAL MEDICINE CLINIC | Age: 70
End: 2020-07-13

## 2020-08-04 RX ORDER — ALBUTEROL SULFATE 90 UG/1
AEROSOL, METERED RESPIRATORY (INHALATION)
Qty: 18 G | Refills: 11 | Status: SHIPPED | OUTPATIENT
Start: 2020-08-04 | End: 2022-02-03 | Stop reason: SDUPTHER

## 2020-08-25 ENCOUNTER — HOSPITAL ENCOUNTER (OUTPATIENT)
Dept: MAMMOGRAPHY | Age: 70
Discharge: HOME | End: 2020-08-25
Attending: INTERNAL MEDICINE
Payer: COMMERCIAL

## 2020-08-25 DIAGNOSIS — Z12.39 BREAST SCREENING: ICD-10-CM

## 2020-08-25 PROCEDURE — 77067 SCR MAMMO BI INCL CAD: CPT

## 2020-09-22 LAB
ALBUMIN SERPL-MCNC: 3.8 G/DL (ref 3.8–4.8)
ALBUMIN/CREAT UR: 5 MG/G CREAT (ref 0–29)
ALBUMIN/GLOB SERPL: 1.1 {RATIO} (ref 1.2–2.2)
ALP SERPL-CCNC: 124 IU/L (ref 39–117)
ALT SERPL-CCNC: 24 IU/L (ref 0–32)
AST SERPL-CCNC: 26 IU/L (ref 0–40)
BILIRUB SERPL-MCNC: 0.4 MG/DL (ref 0–1.2)
BUN SERPL-MCNC: 15 MG/DL (ref 8–27)
BUN/CREAT SERPL: 18 (ref 12–28)
CALCIUM SERPL-MCNC: 9.1 MG/DL (ref 8.7–10.3)
CHLORIDE SERPL-SCNC: 106 MMOL/L (ref 96–106)
CHOLEST SERPL-MCNC: 176 MG/DL (ref 100–199)
CO2 SERPL-SCNC: 28 MMOL/L (ref 20–29)
CREAT SERPL-MCNC: 0.84 MG/DL (ref 0.57–1)
CREAT UR-MCNC: 111.1 MG/DL
ERYTHROCYTE [DISTWIDTH] IN BLOOD BY AUTOMATED COUNT: 13.2 % (ref 11.7–15.4)
EST. AVERAGE GLUCOSE BLD GHB EST-MCNC: 151 MG/DL
GLOBULIN SER CALC-MCNC: 3.6 G/DL (ref 1.5–4.5)
GLUCOSE SERPL-MCNC: 111 MG/DL (ref 65–99)
HBA1C MFR BLD: 6.9 % (ref 4.8–5.6)
HCT VFR BLD AUTO: 43.5 % (ref 34–46.6)
HDLC SERPL-MCNC: 46 MG/DL
HGB BLD-MCNC: 14 G/DL (ref 11.1–15.9)
LDLC SERPL CALC-MCNC: 116 MG/DL (ref 0–99)
MCH RBC QN AUTO: 28.8 PG (ref 26.6–33)
MCHC RBC AUTO-ENTMCNC: 32.2 G/DL (ref 31.5–35.7)
MCV RBC AUTO: 90 FL (ref 79–97)
MICROALBUMIN UR-MCNC: 5.3 UG/ML
PLATELET # BLD AUTO: 192 X10E3/UL (ref 150–450)
POTASSIUM SERPL-SCNC: 4.9 MMOL/L (ref 3.5–5.2)
PROT SERPL-MCNC: 7.4 G/DL (ref 6–8.5)
RBC # BLD AUTO: 4.86 X10E6/UL (ref 3.77–5.28)
SODIUM SERPL-SCNC: 144 MMOL/L (ref 134–144)
TRIGL SERPL-MCNC: 77 MG/DL (ref 0–149)
VLDLC SERPL CALC-MCNC: 14 MG/DL (ref 5–40)
WBC # BLD AUTO: 5.4 X10E3/UL (ref 3.4–10.8)

## 2020-09-24 NOTE — PROGRESS NOTES
Tell pt lindala lytes kidney, liver urine protein cbc  3 mos bs avg is 151--good control of diabetes  LDL cholesterol level is above goal on lipitor 20 gm every day--recommend increased dose of lipitor 40 mg every day if she has been taking the 20 mg dose regularly--please escribe 40 mg every day if willing to take this dose of lipitor

## 2020-09-28 NOTE — PROGRESS NOTES
Called, spoke to pt. Two identifiers confirmed. Notified pt of lab results/recommendations per Dr. Maranda Talavera. Pt stated she has not been taking lipitor daily. Advised pt to start taking lipitor as prescribed. Pt verbalized understanding of information discussed w/ no further questions at this time.

## 2020-12-29 RX ORDER — METFORMIN HYDROCHLORIDE 500 MG/1
TABLET ORAL
Qty: 180 TAB | Refills: 3 | Status: SHIPPED | OUTPATIENT
Start: 2020-12-29 | End: 2021-07-08 | Stop reason: SDUPTHER

## 2020-12-29 NOTE — TELEPHONE ENCOUNTER
Future Appointments:  Future Appointments   Date Time Provider Stacia Sasha   1/7/2021 11:15 AM Cathi Smith MD [facility] BS AMB        Last Appointment With Me:  7/8/2020     Requested Prescriptions      No prescriptions requested or ordered in this encounter

## 2021-01-04 RX ORDER — ATORVASTATIN CALCIUM 20 MG/1
20 TABLET, FILM COATED ORAL DAILY
Qty: 90 TAB | Refills: 3 | Status: SHIPPED | OUTPATIENT
Start: 2021-01-04 | End: 2021-07-08 | Stop reason: SDUPTHER

## 2021-01-04 NOTE — TELEPHONE ENCOUNTER
Future Appointments:  Future Appointments   Date Time Provider Stacia Mcarthuri   1/7/2021 11:15 AM Aurelio Gottlieb MD [facility] BS AMB        Last Appointment With Me:  7/8/2020     Requested Prescriptions     Pending Prescriptions Disp Refills    atorvastatin (LIPITOR) 20 mg tablet 90 Tab 3     Sig: Take 1 Tab by mouth daily.

## 2021-01-07 ENCOUNTER — TELEPHONE (OUTPATIENT)
Dept: INTERNAL MEDICINE CLINIC | Age: 71
End: 2021-01-07

## 2021-01-07 NOTE — TELEPHONE ENCOUNTER
Spoke with patient using 2 identifiers. Patient is having problem with her insurance coverage. Patient will reschedule her appointment once her insurance is correct.

## 2021-07-03 NOTE — PROGRESS NOTES
HISTORY OF PRESENT ILLNESS  Donte Pires is a 79 y.o. female. HPI   F/u DM-2 htn hld obesity bmi 40 hx mild asthma/copd  and medicare wellness-------  Last a1c 6.9 LDl 116  fsbs---130 in mornings   No cp sob or sxs of neuropathy  Some right leg pain intermittenlty from right lateral hip to lower leg  No pain today  Rare back pain  C/o hearing loss b/l  C/o dark rash on arch of left foot  Last OV       Last a1c 7.0 ldl 87  fsbs-123 this morning. . Almost always <140  No cp or sob per pt  No neuropathy  Walks for exercise  Still works 4 d week   requests refill of ab neb for wheezes  bp wnl at home  Weight stable       Patient Active Problem List    Diagnosis Date Noted    Severe obesity ([facility] [address] 12/31/2018    COPD (chronic obstructive pulmonary disease) ([facility] 06/30/2015    Type II or unspecified type diabetes mellitus without mention of complication, not stated as uncontrolled 09/03/2013    Obesity 05/10/2011    HTN (hypertension) 11/08/2010    Asthma 02/21/2010    Pure hypercholesterolemia 02/21/2010    Colon polyp 02/21/2010    Fibrocystic breast 02/21/2010    Fibroid uterus 02/21/2010    Colitis 02/21/2010     Current Outpatient Medications   Medication Sig Dispense Refill    atorvastatin (LIPITOR) 20 mg tablet Take 1 Tab by mouth daily. 90 Tab 3    metFORMIN (GLUCOPHAGE) 500 mg tablet TAKE 1 TABLET BY MOUTH TWICE DAILY WITH MEALS 180 Tab 3    albuterol (PROVENTIL HFA, VENTOLIN HFA, PROAIR HFA) 90 mcg/actuation inhaler INHALE 1 PUFF BY MOUTH EVERY 4 HOURS AS NEEDED FOR WHEEZING 18 g 11    lisinopril-hydroCHLOROthiazide (PRINZIDE, ZESTORETIC) 20-25 mg per tablet TAKE 1 TABLET BY MOUTH DAILY 90 Tab 3    albuterol (PROVENTIL VENTOLIN) 2.5 mg /3 mL (0.083 %) nebu 1.5 mL by Nebulization route every four (4) hours as needed for Wheezing. 30 Nebule 5    calcium-cholecalciferol, d3, (CALCIUM 600 + D) 600-125 mg-unit tab Take 1 Tab by mouth two (2) times a day.       Aspirin, Buffered 81 mg tab Take  by mouth.      glucose blood VI test strips (TRUETRACK TEST) strip 1 Each by Does Not Apply route Daily (before breakfast). 1 Package 11    FA/MV,CA,FE,MIN/LYCOPENE/LUT (MULTIVITAL PO) Take  by mouth daily. No Known Allergies   Lab Results   Component Value Date/Time    WBC 5.4 09/21/2020 11:37 AM    HGB 14.0 09/21/2020 11:37 AM    HCT 43.5 09/21/2020 11:37 AM    PLATELET 194 49/56/7405 11:37 AM    MCV 90 09/21/2020 11:37 AM     Lab Results   Component Value Date/Time    Hemoglobin A1c 6.9 (H) 09/21/2020 11:37 AM    Hemoglobin A1c 7.0 (H) 12/31/2018 12:05 PM    Hemoglobin A1c 6.7 (H) 06/27/2018 10:24 AM    Glucose 111 (H) 09/21/2020 11:37 AM    Glucose POC 88 11/23/2012 03:22 PM    Microalb/Creat ratio (ug/mg creat.) 5 09/21/2020 11:37 AM    LDL, calculated 116 (H) 09/21/2020 11:37 AM    LDL, calculated 87 09/26/2019 10:14 AM    Creatinine 0.84 09/21/2020 11:37 AM      Lab Results   Component Value Date/Time    Cholesterol, total 176 09/21/2020 11:37 AM    Cholesterol (POC) 184 03/17/2015 11:23 AM    HDL Cholesterol 46 09/21/2020 11:37 AM    LDL, calculated 116 (H) 09/21/2020 11:37 AM    LDL, calculated 87 09/26/2019 10:14 AM    LDL Cholesterol (POC) 117 03/17/2015 11:23 AM    Triglyceride 77 09/21/2020 11:37 AM    Triglycerides (POC) 76 03/17/2015 11:23 AM     Lab Results   Component Value Date/Time    GFR est non-AA 71 09/21/2020 11:37 AM    GFR est AA 82 09/21/2020 11:37 AM    Creatinine 0.84 09/21/2020 11:37 AM    BUN 15 09/21/2020 11:37 AM    Sodium 144 09/21/2020 11:37 AM    Potassium 4.9 09/21/2020 11:37 AM    Chloride 106 09/21/2020 11:37 AM    CO2 28 09/21/2020 11:37 AM        ROS    Physical Exam  Vitals and nursing note reviewed. Constitutional:       Appearance: She is well-developed. Comments: Appears stated age   Cardiovascular:      Rate and Rhythm: Normal rate and regular rhythm. Heart sounds: Normal heart sounds. No murmur heard. No friction rub. No gallop.     Pulmonary: Effort: Pulmonary effort is normal. No respiratory distress. Breath sounds: Normal breath sounds. No wheezing. Abdominal:      General: Bowel sounds are normal.      Palpations: Abdomen is soft. Neurological:      General: No focal deficit present. Mental Status: She is alert. ASSESSMENT and PLAN  Diagnoses and all orders for this visit:    1. Breast screening  -     [facility] MAMMO BI SCREENING INCL CAD; Future    2. Chronic obstructive pulmonary disease, unspecified COPD type ([facility]    3. Controlled type 2 diabetes mellitus without complication, without long-term current use of insulin (HCC)  -      DIABETES FOOT EXAM  -     HEMOGLOBIN A1C WITH EAG; Future    4. Essential hypertension  -     CBC W/O DIFF; Future  -     METABOLIC PANEL, COMPREHENSIVE; Future   controlled    5. Pure hypercholesterolemia  -     LIPID PANEL; Future  -     TSH 3RD GENERATION; Future    6. Obesity, Class III, BMI 40-49.9 (morbid obesity) ([facility]    7. Bilateral hearing loss, unspecified hearing loss type  -     REFERRAL TO ENT-OTOLARYNGOLOGY    8. Left foot rash   C/w eczema-rx for kenalog crm    9. Right leg pain   ? Referred from back? Intermittent symptoms   otc medications, observe for now  Other orders  -     triamcinolone acetonide (KENALOG) 0.1 % topical cream; Apply  to affected area two (2) times a day. use thin layer      Follow-up and Dispositions    · Return in about 6 months (around 1/6/2022) for medicare wellnesss x30 min.

## 2021-07-06 ENCOUNTER — OFFICE VISIT (OUTPATIENT)
Dept: INTERNAL MEDICINE CLINIC | Age: 71
End: 2021-07-06
Payer: MEDICARE

## 2021-07-06 VITALS
DIASTOLIC BLOOD PRESSURE: 78 MMHG | HEIGHT: 61 IN | WEIGHT: 220 LBS | TEMPERATURE: 97.3 F | SYSTOLIC BLOOD PRESSURE: 136 MMHG | HEART RATE: 84 BPM | RESPIRATION RATE: 16 BRPM | BODY MASS INDEX: 41.54 KG/M2 | OXYGEN SATURATION: 97 %

## 2021-07-06 DIAGNOSIS — E78.00 PURE HYPERCHOLESTEROLEMIA: ICD-10-CM

## 2021-07-06 DIAGNOSIS — E66.01 OBESITY, CLASS III, BMI 40-49.9 (MORBID OBESITY) (HCC): ICD-10-CM

## 2021-07-06 DIAGNOSIS — H91.93 BILATERAL HEARING LOSS, UNSPECIFIED HEARING LOSS TYPE: ICD-10-CM

## 2021-07-06 DIAGNOSIS — J44.9 CHRONIC OBSTRUCTIVE PULMONARY DISEASE, UNSPECIFIED COPD TYPE (HCC): ICD-10-CM

## 2021-07-06 DIAGNOSIS — Z12.39 BREAST SCREENING: Primary | ICD-10-CM

## 2021-07-06 DIAGNOSIS — I10 ESSENTIAL HYPERTENSION: ICD-10-CM

## 2021-07-06 DIAGNOSIS — E11.9 CONTROLLED TYPE 2 DIABETES MELLITUS WITHOUT COMPLICATION, WITHOUT LONG-TERM CURRENT USE OF INSULIN (HCC): ICD-10-CM

## 2021-07-06 PROCEDURE — 3017F COLORECTAL CA SCREEN DOC REV: CPT | Performed by: INTERNAL MEDICINE

## 2021-07-06 PROCEDURE — G8510 SCR DEP NEG, NO PLAN REQD: HCPCS | Performed by: INTERNAL MEDICINE

## 2021-07-06 PROCEDURE — 3046F HEMOGLOBIN A1C LEVEL >9.0%: CPT | Performed by: INTERNAL MEDICINE

## 2021-07-06 PROCEDURE — G8754 DIAS BP LESS 90: HCPCS | Performed by: INTERNAL MEDICINE

## 2021-07-06 PROCEDURE — G8417 CALC BMI ABV UP PARAM F/U: HCPCS | Performed by: INTERNAL MEDICINE

## 2021-07-06 PROCEDURE — 2022F DILAT RTA XM EVC RTNOPTHY: CPT | Performed by: INTERNAL MEDICINE

## 2021-07-06 PROCEDURE — G8536 NO DOC ELDER MAL SCRN: HCPCS | Performed by: INTERNAL MEDICINE

## 2021-07-06 PROCEDURE — G8752 SYS BP LESS 140: HCPCS | Performed by: INTERNAL MEDICINE

## 2021-07-06 PROCEDURE — 1101F PT FALLS ASSESS-DOCD LE1/YR: CPT | Performed by: INTERNAL MEDICINE

## 2021-07-06 PROCEDURE — 99214 OFFICE O/P EST MOD 30 MIN: CPT | Performed by: INTERNAL MEDICINE

## 2021-07-06 PROCEDURE — G8427 DOCREV CUR MEDS BY ELIG CLIN: HCPCS | Performed by: INTERNAL MEDICINE

## 2021-07-06 PROCEDURE — G8399 PT W/DXA RESULTS DOCUMENT: HCPCS | Performed by: INTERNAL MEDICINE

## 2021-07-06 PROCEDURE — 1090F PRES/ABSN URINE INCON ASSESS: CPT | Performed by: INTERNAL MEDICINE

## 2021-07-06 PROCEDURE — G9899 SCRN MAM PERF RSLTS DOC: HCPCS | Performed by: INTERNAL MEDICINE

## 2021-07-06 RX ORDER — TRIAMCINOLONE ACETONIDE 1 MG/G
CREAM TOPICAL 2 TIMES DAILY
Qty: 15 G | Refills: 0 | Status: SHIPPED | OUTPATIENT
Start: 2021-07-06

## 2021-07-06 RX ORDER — LISINOPRIL AND HYDROCHLOROTHIAZIDE 20; 25 MG/1; MG/1
TABLET ORAL
Qty: 90 TABLET | Refills: 3 | Status: SHIPPED | OUTPATIENT
Start: 2021-07-06 | End: 2022-04-01 | Stop reason: SDUPTHER

## 2021-07-06 RX ORDER — ALBUTEROL SULFATE 0.83 MG/ML
1.25 SOLUTION RESPIRATORY (INHALATION)
Qty: 30 NEBULE | Refills: 5 | Status: SHIPPED | OUTPATIENT
Start: 2021-07-06 | End: 2022-04-01 | Stop reason: SDUPTHER

## 2021-07-06 NOTE — TELEPHONE ENCOUNTER
Future Appointments:  No future appointments. Last Appointment With Me:  2021     Requested Prescriptions     Pending Prescriptions Disp Refills    albuterol (PROVENTIL VENTOLIN) 2.5 mg /3 mL (0.083 %) nebu 30 Nebule 5     Si.5 mL by Nebulization route every four (4) hours as needed for Wheezing.     lisinopril-hydroCHLOROthiazide (PRINZIDE, ZESTORETIC) 20-25 mg per tablet 90 Tablet 3     Sig: TAKE 1 TABLET BY MOUTH DAILY

## 2021-07-06 NOTE — PATIENT INSTRUCTIONS
Office Policies    Phone calls/patient messages:            Please allow up to 24 hours for someone in the office to contact you about your call or message. Be mindful your provider may be out of the office or your message may require further review. We encourage you to use [employer] for your messages as this is a faster, more efficient way to communicate with our office                         Medication Refills:            Prescription medications require 48-72 business hours to process. We encourage you to use [employer] for your refills. For controlled medications: Please allow 72 business hours to process. Certain medications may require you to  a written prescription at our office. NO narcotic/controlled medications will be prescribed after 4pm Monday through Friday or on weekends              Form/Paperwork Completion:            Please note a $25 fee may incur for all paperwork for completed by our providers. We ask that you allow 7-10 business days. Pre-payment is due prior to picking up/faxing the completed form. You may also download your forms to [employer] to have your doctor print off.

## 2021-07-07 LAB
ALBUMIN SERPL-MCNC: 3.6 G/DL (ref 3.5–5)
ALBUMIN/GLOB SERPL: 0.9 {RATIO} (ref 1.1–2.2)
ALP SERPL-CCNC: 116 U/L (ref 45–117)
ALT SERPL-CCNC: 25 U/L (ref 12–78)
ANION GAP SERPL CALC-SCNC: 1 MMOL/L (ref 5–15)
AST SERPL-CCNC: 21 U/L (ref 15–37)
BILIRUB SERPL-MCNC: 0.7 MG/DL (ref 0.2–1)
BUN SERPL-MCNC: 13 MG/DL (ref 6–20)
BUN/CREAT SERPL: 17 (ref 12–20)
CALCIUM SERPL-MCNC: 9.4 MG/DL (ref 8.5–10.1)
CHLORIDE SERPL-SCNC: 106 MMOL/L (ref 97–108)
CHOLEST SERPL-MCNC: 186 MG/DL
CO2 SERPL-SCNC: 35 MMOL/L (ref 21–32)
CREAT SERPL-MCNC: 0.78 MG/DL (ref 0.55–1.02)
ERYTHROCYTE [DISTWIDTH] IN BLOOD BY AUTOMATED COUNT: 13.4 % (ref 11.5–14.5)
EST. AVERAGE GLUCOSE BLD GHB EST-MCNC: 157 MG/DL
GLOBULIN SER CALC-MCNC: 4 G/DL (ref 2–4)
GLUCOSE SERPL-MCNC: 119 MG/DL (ref 65–100)
HBA1C MFR BLD: 7.1 % (ref 4–5.6)
HCT VFR BLD AUTO: 44.1 % (ref 35–47)
HDLC SERPL-MCNC: 61 MG/DL
HDLC SERPL: 3 {RATIO} (ref 0–5)
HGB BLD-MCNC: 13.6 G/DL (ref 11.5–16)
LDLC SERPL CALC-MCNC: 107.8 MG/DL (ref 0–100)
MCH RBC QN AUTO: 29.1 PG (ref 26–34)
MCHC RBC AUTO-ENTMCNC: 30.8 G/DL (ref 30–36.5)
MCV RBC AUTO: 94.2 FL (ref 80–99)
NRBC # BLD: 0 K/UL (ref 0–0.01)
NRBC BLD-RTO: 0 PER 100 WBC
PLATELET # BLD AUTO: 171 K/UL (ref 150–400)
POTASSIUM SERPL-SCNC: 4.1 MMOL/L (ref 3.5–5.1)
PROT SERPL-MCNC: 7.6 G/DL (ref 6.4–8.2)
RBC # BLD AUTO: 4.68 M/UL (ref 3.8–5.2)
SODIUM SERPL-SCNC: 142 MMOL/L (ref 136–145)
TRIGL SERPL-MCNC: 86 MG/DL (ref ?–150)
TSH SERPL DL<=0.05 MIU/L-ACNC: 1.48 UIU/ML (ref 0.36–3.74)
VLDLC SERPL CALC-MCNC: 17.2 MG/DL
WBC # BLD AUTO: 5.7 K/UL (ref 3.6–11)

## 2021-07-08 RX ORDER — ATORVASTATIN CALCIUM 40 MG/1
40 TABLET, FILM COATED ORAL DAILY
Qty: 90 TABLET | Refills: 3 | Status: SHIPPED | OUTPATIENT
Start: 2021-07-08 | End: 2022-04-01 | Stop reason: SDUPTHER

## 2021-07-08 RX ORDER — METFORMIN HYDROCHLORIDE 500 MG/1
TABLET ORAL
Qty: 360 TABLET | Refills: 3 | Status: SHIPPED | OUTPATIENT
Start: 2021-07-08 | End: 2022-04-01 | Stop reason: SDUPTHER

## 2021-07-21 ENCOUNTER — TRANSCRIBE ORDER (OUTPATIENT)
Dept: SCHEDULING | Age: 71
End: 2021-07-21

## 2021-07-21 DIAGNOSIS — Z12.31 ENCOUNTER FOR SCREENING MAMMOGRAM FOR MALIGNANT NEOPLASM OF BREAST: Primary | ICD-10-CM

## 2021-08-27 ENCOUNTER — HOSPITAL ENCOUNTER (OUTPATIENT)
Dept: MAMMOGRAPHY | Age: 71
Discharge: HOME | End: 2021-08-27
Attending: INTERNAL MEDICINE
Payer: MEDICARE

## 2021-08-27 DIAGNOSIS — Z12.31 ENCOUNTER FOR SCREENING MAMMOGRAM FOR MALIGNANT NEOPLASM OF BREAST: ICD-10-CM

## 2021-08-27 PROCEDURE — 77067 SCR MAMMO BI INCL CAD: CPT

## 2021-09-27 ENCOUNTER — TELEPHONE (OUTPATIENT)
Dept: INTERNAL MEDICINE CLINIC | Age: 71
End: 2021-09-27

## 2021-09-27 NOTE — TELEPHONE ENCOUNTER
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

## 2021-09-28 NOTE — TELEPHONE ENCOUNTER
Spoke with patient using 2 identifiers. Patient is feeling much better this morning. She is planning to go get COVID tested this up coming Monday. Patient decline RX medication at this point and time but will call if she need the RX.

## 2022-02-03 ENCOUNTER — PATIENT OUTREACH (OUTPATIENT)
Dept: INTERNAL MEDICINE CLINIC | Age: 72
End: 2022-02-03

## 2022-02-03 ENCOUNTER — OFFICE VISIT (OUTPATIENT)
Dept: INTERNAL MEDICINE CLINIC | Age: 72
End: 2022-02-03
Payer: MEDICARE

## 2022-02-03 VITALS
DIASTOLIC BLOOD PRESSURE: 78 MMHG | HEIGHT: 62 IN | SYSTOLIC BLOOD PRESSURE: 140 MMHG | WEIGHT: 212.4 LBS | TEMPERATURE: 97.4 F | RESPIRATION RATE: 16 BRPM | HEART RATE: 72 BPM | OXYGEN SATURATION: 98 % | BODY MASS INDEX: 39.08 KG/M2

## 2022-02-03 DIAGNOSIS — E78.00 PURE HYPERCHOLESTEROLEMIA: ICD-10-CM

## 2022-02-03 DIAGNOSIS — E11.9 TYPE 2 DIABETES MELLITUS WITHOUT COMPLICATION, WITHOUT LONG-TERM CURRENT USE OF INSULIN (HCC): Primary | ICD-10-CM

## 2022-02-03 DIAGNOSIS — J45.20 MILD INTERMITTENT ASTHMA, UNSPECIFIED WHETHER COMPLICATED: ICD-10-CM

## 2022-02-03 DIAGNOSIS — I10 HYPERTENSION, UNSPECIFIED TYPE: ICD-10-CM

## 2022-02-03 DIAGNOSIS — Z00.00 MEDICARE ANNUAL WELLNESS VISIT, SUBSEQUENT: ICD-10-CM

## 2022-02-03 DIAGNOSIS — E66.01 SEVERE OBESITY (BMI 35.0-35.9 WITH COMORBIDITY) (HCC): ICD-10-CM

## 2022-02-03 DIAGNOSIS — G89.29 CHRONIC PAIN OF BOTH SHOULDERS: ICD-10-CM

## 2022-02-03 DIAGNOSIS — M25.512 CHRONIC PAIN OF BOTH SHOULDERS: ICD-10-CM

## 2022-02-03 DIAGNOSIS — M25.511 CHRONIC PAIN OF BOTH SHOULDERS: ICD-10-CM

## 2022-02-03 LAB
ALBUMIN SERPL-MCNC: 3.7 G/DL (ref 3.5–5)
ALBUMIN/GLOB SERPL: 0.9 {RATIO} (ref 1.1–2.2)
ALP SERPL-CCNC: 124 U/L (ref 45–117)
ALT SERPL-CCNC: 23 U/L (ref 12–78)
ANION GAP SERPL CALC-SCNC: 3 MMOL/L (ref 5–15)
AST SERPL-CCNC: 15 U/L (ref 15–37)
BILIRUB SERPL-MCNC: 0.7 MG/DL (ref 0.2–1)
BUN SERPL-MCNC: 10 MG/DL (ref 6–20)
BUN/CREAT SERPL: 12 (ref 12–20)
CALCIUM SERPL-MCNC: 9.6 MG/DL (ref 8.5–10.1)
CHLORIDE SERPL-SCNC: 107 MMOL/L (ref 97–108)
CHOLEST SERPL-MCNC: 169 MG/DL
CO2 SERPL-SCNC: 32 MMOL/L (ref 21–32)
CREAT SERPL-MCNC: 0.85 MG/DL (ref 0.55–1.02)
CREAT UR-MCNC: 184 MG/DL
EST. AVERAGE GLUCOSE BLD GHB EST-MCNC: 154 MG/DL
GLOBULIN SER CALC-MCNC: 4.2 G/DL (ref 2–4)
GLUCOSE SERPL-MCNC: 118 MG/DL (ref 65–100)
HBA1C MFR BLD: 7 % (ref 4–5.6)
HDLC SERPL-MCNC: 65 MG/DL
HDLC SERPL: 2.6 {RATIO} (ref 0–5)
LDLC SERPL CALC-MCNC: 86.2 MG/DL (ref 0–100)
MICROALBUMIN UR-MCNC: 1.72 MG/DL
MICROALBUMIN/CREAT UR-RTO: 9 MG/G (ref 0–30)
POTASSIUM SERPL-SCNC: 4.1 MMOL/L (ref 3.5–5.1)
PROT SERPL-MCNC: 7.9 G/DL (ref 6.4–8.2)
SODIUM SERPL-SCNC: 142 MMOL/L (ref 136–145)
TRIGL SERPL-MCNC: 89 MG/DL (ref ?–150)
VLDLC SERPL CALC-MCNC: 17.8 MG/DL

## 2022-02-03 PROCEDURE — 2022F DILAT RTA XM EVC RTNOPTHY: CPT | Performed by: INTERNAL MEDICINE

## 2022-02-03 PROCEDURE — 3017F COLORECTAL CA SCREEN DOC REV: CPT | Performed by: INTERNAL MEDICINE

## 2022-02-03 PROCEDURE — 3046F HEMOGLOBIN A1C LEVEL >9.0%: CPT | Performed by: INTERNAL MEDICINE

## 2022-02-03 PROCEDURE — 99213 OFFICE O/P EST LOW 20 MIN: CPT | Performed by: INTERNAL MEDICINE

## 2022-02-03 PROCEDURE — 1101F PT FALLS ASSESS-DOCD LE1/YR: CPT | Performed by: INTERNAL MEDICINE

## 2022-02-03 PROCEDURE — G8417 CALC BMI ABV UP PARAM F/U: HCPCS | Performed by: INTERNAL MEDICINE

## 2022-02-03 PROCEDURE — 1090F PRES/ABSN URINE INCON ASSESS: CPT | Performed by: INTERNAL MEDICINE

## 2022-02-03 PROCEDURE — G8510 SCR DEP NEG, NO PLAN REQD: HCPCS | Performed by: INTERNAL MEDICINE

## 2022-02-03 PROCEDURE — G0439 PPPS, SUBSEQ VISIT: HCPCS | Performed by: INTERNAL MEDICINE

## 2022-02-03 PROCEDURE — G9899 SCRN MAM PERF RSLTS DOC: HCPCS | Performed by: INTERNAL MEDICINE

## 2022-02-03 PROCEDURE — G8427 DOCREV CUR MEDS BY ELIG CLIN: HCPCS | Performed by: INTERNAL MEDICINE

## 2022-02-03 PROCEDURE — G8752 SYS BP LESS 140: HCPCS | Performed by: INTERNAL MEDICINE

## 2022-02-03 PROCEDURE — G8754 DIAS BP LESS 90: HCPCS | Performed by: INTERNAL MEDICINE

## 2022-02-03 PROCEDURE — G8399 PT W/DXA RESULTS DOCUMENT: HCPCS | Performed by: INTERNAL MEDICINE

## 2022-02-03 PROCEDURE — G8536 NO DOC ELDER MAL SCRN: HCPCS | Performed by: INTERNAL MEDICINE

## 2022-02-03 RX ORDER — ALBUTEROL SULFATE 90 UG/1
AEROSOL, METERED RESPIRATORY (INHALATION)
Qty: 18 G | Refills: 11 | Status: CANCELLED | OUTPATIENT
Start: 2022-02-03

## 2022-02-03 RX ORDER — ALBUTEROL SULFATE 90 UG/1
AEROSOL, METERED RESPIRATORY (INHALATION)
Qty: 18 G | Refills: 11 | Status: SHIPPED | OUTPATIENT
Start: 2022-02-03 | End: 2022-04-01 | Stop reason: SDUPTHER

## 2022-02-03 NOTE — TELEPHONE ENCOUNTER
Future Appointments:  No future appointments.      Last Appointment With Me:  2/3/2022     Requested Prescriptions     Pending Prescriptions Disp Refills    albuterol (PROVENTIL HFA, VENTOLIN HFA, PROAIR HFA) 90 mcg/actuation inhaler 18 g 11

## 2022-02-03 NOTE — PATIENT INSTRUCTIONS

## 2022-02-03 NOTE — PROGRESS NOTES
Remote Patient Monitoring Enrollment Note      Date/Time:  2/3/2022 12:01 PM    Offered patient enrollment in the [facility] Remote Patient Monitoring (RPM) program for in home monitoring for Diabetes and HTN. Patient accepted RPM services. Patient will be monitoring activity, blood pressure , glucose, weight, survey questions and disease specific education modules  daily. ACM reviewed the information below with patient:    Emergency Contact:  spouse Felisa Aden [phone]    [x] Patient received package today after office visit with Dr Yovana Caruso. [x] This ACM assisted with set up. [x] Determined BP cuff side. [x] Hours of ACM monitoring - Monday-Friday [id number]                     All questions answered at this time. Remote Patient Monitoring Set Up Note      Date/Time:  2/3/2022 11:52 AM    Received e-mail confirmation that Remote Patient Monitoring shipment was successfully delivered. Patient contacted to assist in Remote Patient Monitoring device set up. Verified patients name and  as identifiers.   Completed and confirmed the following:     Emergency Contact: spouse Felisa Aden [phone]    [x] Patient received all RPM equipment (tablet, scale, blood pressure device and cuff, and pulse oximeter)              [x] Instructed patient keep box for use when returning equipment                                                          [x] Reviewed Patient Welcome Letter with patient                         [x] Patient signed consent on tablet   [x] Assisted patient in setting up tablet and blue tooth devices             [x] Instructed patient to keep scale on flat surface                                                         [x] Instructed patient to keep tablet plugged in at all times                         [x] Reviewed tablet setting with patient   [x] Instructed how to contact IT support from tablet   [x] Provided Remote Patient Monitoring ACM contact information                  All questions answered at this time. ACM made aware the Remote Patient Monitoring set up has been completed.

## 2022-02-03 NOTE — Clinical Note
Offered patient enrollment in the [facility] Remote Patient Monitoring (RPM) program for in home monitoring for Diabetes and HTN. Patient accepted RPM services.

## 2022-02-03 NOTE — PROGRESS NOTES
HISTORY OF PRESENT ILLNESS  Smith Og is a 70 y.o. female. HPI   F/u DM-2 htn hld obesity bmi 40 hx mild asthma and medicare welnenss--  Last a1c 7.1   fsbs around 140 in am on average  Asthma symptoms-mild per pt-albuterol prn    Weight down 8 lbs  Walks dog for exercise  Retired from [employer] 2 months ago  Scheduled for colonoscopy later this month      C/p difficulty raising arms x years  Had work injury to shoulders 3 yrs ago    Last OV    Last a1c 6.9 LDl 116  fsbs---130 in mornings   No cp sob or sxs of neuropathy  Some right leg pain intermittenlty from right lateral hip to lower leg  No pain today  Rare back pain  C/o hearing loss b/l  C/o dark rash on arch of left foot    Patient Active Problem List    Diagnosis Date Noted    Severe obesity ([facility] [address] 12/31/2018    COPD (chronic obstructive pulmonary disease) ([facility] [address] 06/30/2015    Type II or unspecified type diabetes mellitus without mention of complication, not stated as uncontrolled 09/03/2013    Obesity 05/10/2011    HTN (hypertension) 11/08/2010    Asthma 02/21/2010    Pure hypercholesterolemia 02/21/2010    Colon polyp 02/21/2010    Fibrocystic breast 02/21/2010    Fibroid uterus 02/21/2010    Colitis 02/21/2010     Current Outpatient Medications   Medication Sig Dispense Refill    metFORMIN (GLUCOPHAGE) 500 mg tablet TAKE 2 TABLETs BY MOUTH TWICE DAILY WITH MEALS 360 Tablet 3    atorvastatin (LIPITOR) 40 mg tablet Take 1 Tablet by mouth daily. 90 Tablet 3    albuterol (PROVENTIL VENTOLIN) 2.5 mg /3 mL (0.083 %) nebu 1.5 mL by Nebulization route every four (4) hours as needed for Wheezing. 30 Nebule 5    lisinopril-hydroCHLOROthiazide (PRINZIDE, ZESTORETIC) 20-25 mg per tablet TAKE 1 TABLET BY MOUTH DAILY 90 Tablet 3    triamcinolone acetonide (KENALOG) 0.1 % topical cream Apply  to affected area two (2) times a day.  use thin layer 15 g 0    albuterol (PROVENTIL HFA, VENTOLIN HFA, PROAIR HFA) 90 mcg/actuation inhaler INHALE 1 PUFF BY MOUTH EVERY 4 HOURS AS NEEDED FOR WHEEZING 18 g 11    calcium-cholecalciferol, d3, (CALCIUM 600 + D) 600-125 mg-unit tab Take 1 Tab by mouth two (2) times a day.  Aspirin, Buffered 81 mg tab Take  by mouth.  glucose blood VI test strips (TRUETRACK TEST) strip 1 Each by Does Not Apply route Daily (before breakfast). 1 Package 11    FA/MV,CA,FE,MIN/LYCOPENE/LUT (MULTIVITAL PO) Take  by mouth daily. No Known Allergies   Lab Results   Component Value Date/Time    WBC 5.7 07/06/2021 11:51 AM    HGB 13.6 07/06/2021 11:51 AM    HCT 44.1 07/06/2021 11:51 AM    PLATELET 070 21/87/1588 11:51 AM    MCV 94.2 07/06/2021 11:51 AM     Lab Results   Component Value Date/Time    Hemoglobin A1c 7.1 (H) 07/06/2021 11:51 AM    Hemoglobin A1c 6.9 (H) 09/21/2020 11:37 AM    Hemoglobin A1c 7.0 (H) 12/31/2018 12:05 PM    Glucose 119 (H) 07/06/2021 11:51 AM    Glucose POC 88 11/23/2012 03:22 PM    Microalb/Creat ratio (ug/mg creat.) 5 09/21/2020 11:37 AM    LDL, calculated 107.8 (H) 07/06/2021 11:51 AM    Creatinine 0.78 07/06/2021 11:51 AM      Lab Results   Component Value Date/Time    Cholesterol, total 186 07/06/2021 11:51 AM    Cholesterol (POC) 184 03/17/2015 11:23 AM    HDL Cholesterol 61 07/06/2021 11:51 AM    LDL, calculated 107.8 (H) 07/06/2021 11:51 AM    LDL Cholesterol (POC) 117 03/17/2015 11:23 AM    Triglyceride 86 07/06/2021 11:51 AM    Triglycerides (POC) 76 03/17/2015 11:23 AM    CHOL/HDL Ratio 3.0 07/06/2021 11:51 AM     Lab Results   Component Value Date/Time    GFR est non-AA >60 07/06/2021 11:51 AM    GFR est AA >60 07/06/2021 11:51 AM    Creatinine 0.78 07/06/2021 11:51 AM    BUN 13 07/06/2021 11:51 AM    Sodium 142 07/06/2021 11:51 AM    Potassium 4.1 07/06/2021 11:51 AM    Chloride 106 07/06/2021 11:51 AM    CO2 35 (H) 07/06/2021 11:51 AM        ROS    Physical Exam  Vitals and nursing note reviewed. Constitutional:       Appearance: Normal appearance. She is well-developed. Comments: Appears stated age   HENT:      Head: Normocephalic. Right Ear: Tympanic membrane normal.      Left Ear: Tympanic membrane normal.      Nose: Nose normal.   Cardiovascular:      Rate and Rhythm: Normal rate and regular rhythm. Heart sounds: Normal heart sounds. No murmur heard. No friction rub. No gallop. Pulmonary:      Effort: Pulmonary effort is normal. No respiratory distress. Breath sounds: Normal breath sounds. No wheezing. Abdominal:      General: Bowel sounds are normal.      Palpations: Abdomen is soft. Musculoskeletal:      Cervical back: Normal range of motion. Comments: Difficulty b/l shoulder abduction  Decreased left shoulder internal rotation   Neurological:      Mental Status: She is alert. ASSESSMENT and PLAN  Diagnoses and all orders for this visit:    1. Type 2 diabetes mellitus without complication, without long-term current use of insulin ([facility] [address]    2. Hypertension, unspecified type    3. Pure hypercholesterolemia    4. Chronic obstructive pulmonary disease, unspecified COPD type ([facility] [address]           This is the Subsequent Medicare Annual Wellness Exam, performed 12 months or more after the Initial AWV or the last Subsequent AWV    I have reviewed the patient's medical history in detail and updated the computerized patient record. Assessment/Plan   Education and counseling provided:  Are appropriate based on today's review and evaluation  End-of-Life planning (with patient's consent)  Colorectal cancer screening tests -colonoscopy next month  Screening for glaucoma    1. Type 2 diabetes mellitus without complication, without long-term current use of insulin (HCC)--a1c . Microalbumin, will get eye exam  2. Hypertension, unspecified type-mild SBP elevation to my check-remote home monitoring kit to be arranged  3. Pure hypercholesterolemia-lipid panel  4.  Asthma-refill albuterol       Depression Risk Factor Screening     3 most recent PHQ Screens 2/3/2022   Little interest or pleasure in doing things Not at all   Feeling down, depressed, irritable, or hopeless Not at all   Total Score PHQ 2 0       Alcohol & Drug Abuse Risk Screen    Do you average more than 1 drink per night or more than 7 drinks a week:  No    On any one occasion in the past three months have you have had more than 3 drinks containing alcohol:  No          Functional Ability and Level of Safety    Hearing: The patient needs further evaluation. Activities of Daily Living: The home contains: grab bars  Patient does total self care      Ambulation: with no difficulty     Fall Risk:  Fall Risk Assessment, last 12 mths 2/3/2022   Able to walk? Yes   Fall in past 12 months? 0   Do you feel unsteady? 0   Are you worried about falling 0   Is TUG test greater than 12 seconds? -   Is the gait abnormal? -   Number of falls in past 12 months -   Fall with injury?  -      Abuse Screen:  Patient is not abused       Cognitive Screening    Has your family/caregiver stated any concerns about your memory: no     Cognitive Screening: Normal - serial 3    Health Maintenance Due     Health Maintenance Due   Topic Date Due    Eye Exam Retinal or Dilated  08/22/2019    Medicare Yearly Exam  01/06/2021    MICROALBUMIN Q1  09/21/2021       Patient Care Team   Patient Care Team:  Billie Ocampo MD as PCP - Tiffanie Young MD as PCP - Masha Pierce Provider    History     Patient Active Problem List   Diagnosis Code    Asthma J45.909    Pure hypercholesterolemia E78.00    Colon polyp K63.5    Fibrocystic breast N60.19    Fibroid uterus D25.9    Colitis K52.9    HTN (hypertension) I10    Obesity E66.9    Type II or unspecified type diabetes mellitus without mention of complication, not stated as uncontrolled E11.9    Severe obesity ([address] E66.01     Past Medical History:   Diagnosis Date    Asthma     Diabetes ([address]     Hypertension     Menopause       No past surgical history on file.  Current Outpatient Medications   Medication Sig Dispense Refill    metFORMIN (GLUCOPHAGE) 500 mg tablet TAKE 2 TABLETs BY MOUTH TWICE DAILY WITH MEALS 360 Tablet 3    atorvastatin (LIPITOR) 40 mg tablet Take 1 Tablet by mouth daily. 90 Tablet 3    lisinopril-hydroCHLOROthiazide (PRINZIDE, ZESTORETIC) 20-25 mg per tablet TAKE 1 TABLET BY MOUTH DAILY 90 Tablet 3    calcium-cholecalciferol, d3, (CALCIUM 600 + D) 600-125 mg-unit tab Take 1 Tab by mouth two (2) times a day.  Aspirin, Buffered 81 mg tab Take  by mouth.  glucose blood VI test strips (TRUETRACK TEST) strip 1 Each by Does Not Apply route Daily (before breakfast). 1 Package 11    FA/MV,CA,FE,MIN/LYCOPENE/LUT (MULTIVITAL PO) Take  by mouth daily.  albuterol (PROVENTIL VENTOLIN) 2.5 mg /3 mL (0.083 %) nebu 1.5 mL by Nebulization route every four (4) hours as needed for Wheezing. 30 Nebule 5    triamcinolone acetonide (KENALOG) 0.1 % topical cream Apply  to affected area two (2) times a day.  use thin layer 15 g 0    albuterol (PROVENTIL HFA, VENTOLIN HFA, PROAIR HFA) 90 mcg/actuation inhaler INHALE 1 PUFF BY MOUTH EVERY 4 HOURS AS NEEDED FOR WHEEZING 18 g 11     No Known Allergies    Family History   Problem Relation Age of Onset    Diabetes Mother     High Cholesterol Mother     Hypertension Mother     Diabetes Sister     Hypertension Sister      Social History     Tobacco Use    Smoking status: Former Smoker     Packs/day: 0.50     Years: 30.00     Pack years: 15.00     Quit date: 2011     Years since quittin.0    Smokeless tobacco: Never Used   Substance Use Topics    Alcohol use: No         Phoenix Cee MD

## 2022-02-04 ENCOUNTER — PATIENT OUTREACH (OUTPATIENT)
Dept: INTERNAL MEDICINE CLINIC | Age: 72
End: 2022-02-04

## 2022-02-04 NOTE — PROGRESS NOTES
Goals      Patient/Family verbalizes understanding of self-management of chronic disease, HTN, T2D using remote pt monitoring. 2/4/22-dkw  -called pt to discuss RPM and no metrics in HRS for her yet today; pt stated she turned on the tablet and she is going to start the program tomorrow  -will continue to monitor    2/3/22-dkw  -Offered patient enrollment in the [facility] Patient Monitoring (RPM) program for in home monitoring for Diabetes and HTN.  Patient accepted RPM services.  -will monitor

## 2022-02-07 ENCOUNTER — PATIENT OUTREACH (OUTPATIENT)
Dept: INTERNAL MEDICINE CLINIC | Age: 72
End: 2022-02-07

## 2022-02-07 NOTE — PROGRESS NOTES
Remote Patient Monitoring Note      Date/Time:  2022 1:14 PM    Patient contacted regarding yellow alert received for survey question- blood pressure reading (yellow zone; /73/70)   Verified patients name and  as identifiers. Ambulatory Care Manager contacted the patient by telephone regarding yellow alert survey question. Pt stated she was in the yellow zone.       Interventions: left pt message asking for a return call  [facility] PCP/Specialitist: none  Education Provided: n/a  Plan/Follow Up: continue to monitor

## 2022-02-18 ENCOUNTER — HOSPITAL ENCOUNTER (OUTPATIENT)
Dept: PREADMISSION TESTING | Age: 72
Discharge: HOME | End: 2022-02-18

## 2022-02-22 ENCOUNTER — HOSPITAL ENCOUNTER (OUTPATIENT)
Age: 72
Setting detail: OUTPATIENT SURGERY
Discharge: HOME | End: 2022-02-22
Attending: INTERNAL MEDICINE | Admitting: INTERNAL MEDICINE
Payer: MEDICARE

## 2022-02-22 ENCOUNTER — PATIENT OUTREACH (OUTPATIENT)
Dept: INTERNAL MEDICINE CLINIC | Age: 72
End: 2022-02-22

## 2022-02-22 ENCOUNTER — ANESTHESIA EVENT (OUTPATIENT)
Dept: ENDOSCOPY | Age: 72
End: 2022-02-22
Payer: MEDICARE

## 2022-02-22 ENCOUNTER — ANESTHESIA (OUTPATIENT)
Dept: ENDOSCOPY | Age: 72
End: 2022-02-22
Payer: MEDICARE

## 2022-02-22 VITALS
HEART RATE: 75 BPM | RESPIRATION RATE: 20 BRPM | TEMPERATURE: 98 F | HEIGHT: 61 IN | BODY MASS INDEX: 39.91 KG/M2 | OXYGEN SATURATION: 99 % | WEIGHT: 211.4 LBS | DIASTOLIC BLOOD PRESSURE: 73 MMHG | SYSTOLIC BLOOD PRESSURE: 177 MMHG

## 2022-02-22 LAB
COVID-19 RAPID TEST, COVR: NOT DETECTED
SOURCE, COVRS: NORMAL

## 2022-02-22 PROCEDURE — 74011000250 HC RX REV CODE- 250: Performed by: ANESTHESIOLOGY

## 2022-02-22 PROCEDURE — 74011250636 HC RX REV CODE- 250/636: Performed by: ANESTHESIOLOGY

## 2022-02-22 PROCEDURE — 88305 TISSUE EXAM BY PATHOLOGIST: CPT

## 2022-02-22 PROCEDURE — 74011250636 HC RX REV CODE- 250/636: Performed by: INTERNAL MEDICINE

## 2022-02-22 PROCEDURE — 77030021593 HC FCPS BIOP ENDOSC BSC -A: Performed by: INTERNAL MEDICINE

## 2022-02-22 PROCEDURE — 2709999900 HC NON-CHARGEABLE SUPPLY: Performed by: INTERNAL MEDICINE

## 2022-02-22 PROCEDURE — 87635 SARS-COV-2 COVID-19 AMP PRB: CPT

## 2022-02-22 PROCEDURE — 76060000031 HC ANESTHESIA FIRST 0.5 HR: Performed by: INTERNAL MEDICINE

## 2022-02-22 PROCEDURE — 76040000019: Performed by: INTERNAL MEDICINE

## 2022-02-22 RX ORDER — SODIUM CHLORIDE 9 MG/ML
100 INJECTION, SOLUTION INTRAVENOUS CONTINUOUS
Status: DISCONTINUED | OUTPATIENT
Start: 2022-02-22 | End: 2022-02-22 | Stop reason: HOSPADM

## 2022-02-22 RX ORDER — LIDOCAINE HYDROCHLORIDE 20 MG/ML
INJECTION, SOLUTION EPIDURAL; INFILTRATION; INTRACAUDAL; PERINEURAL AS NEEDED
Status: DISCONTINUED | OUTPATIENT
Start: 2022-02-22 | End: 2022-02-22 | Stop reason: HOSPADM

## 2022-02-22 RX ORDER — DEXTROMETHORPHAN/PSEUDOEPHED 2.5-7.5/.8
1.2 DROPS ORAL
Status: DISCONTINUED | OUTPATIENT
Start: 2022-02-22 | End: 2022-02-22 | Stop reason: HOSPADM

## 2022-02-22 RX ORDER — NALOXONE HYDROCHLORIDE 0.4 MG/ML
0.4 INJECTION, SOLUTION INTRAMUSCULAR; INTRAVENOUS; SUBCUTANEOUS
Status: DISCONTINUED | OUTPATIENT
Start: 2022-02-22 | End: 2022-02-22 | Stop reason: HOSPADM

## 2022-02-22 RX ORDER — SODIUM CHLORIDE 0.9 % (FLUSH) 0.9 %
5-40 SYRINGE (ML) INJECTION EVERY 8 HOURS
Status: DISCONTINUED | OUTPATIENT
Start: 2022-02-22 | End: 2022-02-22 | Stop reason: HOSPADM

## 2022-02-22 RX ORDER — MIDAZOLAM HYDROCHLORIDE 1 MG/ML
.25-5 INJECTION, SOLUTION INTRAMUSCULAR; INTRAVENOUS
Status: DISCONTINUED | OUTPATIENT
Start: 2022-02-22 | End: 2022-02-22 | Stop reason: HOSPADM

## 2022-02-22 RX ORDER — EPINEPHRINE 0.1 MG/ML
1 INJECTION INTRACARDIAC; INTRAVENOUS
Status: DISCONTINUED | OUTPATIENT
Start: 2022-02-22 | End: 2022-02-22 | Stop reason: HOSPADM

## 2022-02-22 RX ORDER — FLUMAZENIL 0.1 MG/ML
0.2 INJECTION INTRAVENOUS
Status: DISCONTINUED | OUTPATIENT
Start: 2022-02-22 | End: 2022-02-22 | Stop reason: HOSPADM

## 2022-02-22 RX ORDER — SODIUM CHLORIDE 0.9 % (FLUSH) 0.9 %
5-40 SYRINGE (ML) INJECTION AS NEEDED
Status: DISCONTINUED | OUTPATIENT
Start: 2022-02-22 | End: 2022-02-22 | Stop reason: HOSPADM

## 2022-02-22 RX ORDER — PROPOFOL 10 MG/ML
INJECTION, EMULSION INTRAVENOUS AS NEEDED
Status: DISCONTINUED | OUTPATIENT
Start: 2022-02-22 | End: 2022-02-22 | Stop reason: HOSPADM

## 2022-02-22 RX ORDER — ATROPINE SULFATE 0.1 MG/ML
0.5 INJECTION INTRAVENOUS
Status: DISCONTINUED | OUTPATIENT
Start: 2022-02-22 | End: 2022-02-22 | Stop reason: HOSPADM

## 2022-02-22 RX ADMIN — PROPOFOL 220 MG: 10 INJECTION, EMULSION INTRAVENOUS at 15:21

## 2022-02-22 RX ADMIN — SODIUM CHLORIDE 100 ML/HR: 9 INJECTION, SOLUTION INTRAVENOUS at 15:00

## 2022-02-22 RX ADMIN — LIDOCAINE HYDROCHLORIDE 40 MG: 20 INJECTION, SOLUTION EPIDURAL; INFILTRATION; INTRACAUDAL; PERINEURAL at 15:07

## 2022-02-22 NOTE — H&P
Gogo Spence MD  [facility], [address]  [phone]  [email or web address]      Gastroenterology Outpatient History and Physical    Patient: Mary Small    Physician: Jarrod Kessler MD    Vital Signs: There were no vitals taken for this visit. Allergies: No Known Allergies    Chief Complaint: Crohn's    History of Present Illness: Here for repeat colonoscopy due to history of Crohn's disease. History:  Past Medical History:   Diagnosis Date    Asthma     Diabetes ([facility] [address]     Hypertension     Menopause     History reviewed. No pertinent surgical history. Social History     Socioeconomic History    Marital status:    Tobacco Use    Smoking status: Former Smoker     Packs/day: 0.50     Years: 30.00     Pack years: 15.00     Quit date: 2011     Years since quittin.1    Smokeless tobacco: Never Used   Substance and Sexual Activity    Alcohol use: No    Drug use: Yes     Types: Prescription, OTC    Sexual activity: Not Currently      Family History   Problem Relation Age of Onset    Diabetes Mother     High Cholesterol Mother     Hypertension Mother     Diabetes Sister     Hypertension Sister       Patient Active Problem List   Diagnosis Code    Asthma J45.909    Pure hypercholesterolemia E78.00    Colon polyp K63.5    Fibrocystic breast N60.19    Fibroid uterus D25.9    Colitis K52.9    HTN (hypertension) I10    Obesity E66.9    Type II or unspecified type diabetes mellitus without mention of complication, not stated as uncontrolled E11.9    Severe obesity ([facility] [address] E66.01         Medications:   Prior to Admission medications    Medication Sig Start Date End Date Taking?  Authorizing Provider   albuterol (PROVENTIL HFA, VENTOLIN HFA, PROAIR HFA) 90 mcg/actuation inhaler INHALE 1 PUFF BY MOUTH EVERY 4 HOURS AS NEEDED FOR WHEEZING 2/3/22   Amie Cunningham MD   metFORMIN (GLUCOPHAGE) 500 mg tablet TAKE 2 TABLETs BY MOUTH TWICE DAILY WITH MEALS 7/8/21   Poornima Blackwell MD   atorvastatin (LIPITOR) 40 mg tablet Take 1 Tablet by mouth daily. 7/8/21   Poornima Blackwell MD   albuterol (PROVENTIL VENTOLIN) 2.5 mg /3 mL (0.083 %) nebu 1.5 mL by Nebulization route every four (4) hours as needed for Wheezing. 7/6/21   Poornima Blackwell MD   lisinopril-hydroCHLOROthiazide (PRINZIDE, ZESTORETIC) 20-25 mg per tablet TAKE 1 TABLET BY MOUTH DAILY 7/6/21   Poornima Blackwell MD   triamcinolone acetonide (KENALOG) 0.1 % topical cream Apply  to affected area two (2) times a day. use thin layer 7/6/21   Poornima Blackwell MD   calcium-cholecalciferol, d3, (CALCIUM 600 + D) 600-125 mg-unit tab Take 1 Tab by mouth two (2) times a day. Provider, Historical   Aspirin, Buffered 81 mg tab Take  by mouth. Provider, Historical   glucose blood VI test strips (TRUETRACK TEST) strip 1 Each by Does Not Apply route Daily (before breakfast). 5/14/13   Poornima Blackwell MD   FA/MV,CA,FE,MIN/LYCOPENE/LUT (MULTIVITAL PO) Take  by mouth daily.  2/1/10   Provider, Historical       Physical Exam:     General: well developed, well nourished   HEENT: unremarkable   Heart: regular rhythm no mumur    Lungs: clear   Abdominal:  benign   Neurological: unremarkable   Extremities: no edema     Findings/Diagnosis: History of Crohn's disease    Plan of Care/Planned Procedure: Colonoscopy with  monitored anesthesia care sedation       Signed:  Rodrigo Miranda MD 2/22/2022

## 2022-02-22 NOTE — ANESTHESIA PREPROCEDURE EVALUATION
Relevant Problems   RESPIRATORY SYSTEM   (+) Asthma      CARDIOVASCULAR   (+) HTN (hypertension)      ENDOCRINE   (+) Obesity   (+) Severe obesity (HCC)   (+) Type II or unspecified type diabetes mellitus without mention of complication, not stated as uncontrolled       Anesthetic History   No history of anesthetic complications            Review of Systems / Medical History  Patient summary reviewed, nursing notes reviewed and pertinent labs reviewed    Pulmonary          Smoker (Former 15 pk yr smoker, quit 2011)  Asthma : well controlled       Neuro/Psych   Within defined limits           Cardiovascular    Hypertension: well controlled              Exercise tolerance: >4 METS  Comments: 8-2019 ECHO:  Interpretation Summary    · Left Ventricle: Normal cavity size, wall thickness and systolic function (ejection fraction normal). Estimated left ventricular ejection fraction is 56 - 60%. No regional wall motion abnormality noted.  Mild (grade 1) left ventricular diastolic dysfunction       GI/Hepatic/Renal  Within defined limits             Comments: Hx of colon polyps  Hx of crohns Endo/Other    Diabetes: type 2    Morbid obesity     Other Findings            Physical Exam    Airway  Mallampati: II  TM Distance: 4 - 6 cm  Neck ROM: normal range of motion   Mouth opening: Normal     Cardiovascular  Regular rate and rhythm,  S1 and S2 normal,  no murmur, click, rub, or gallop             Dental    Dentition: Full upper dentures and Lower partial plate     Pulmonary  Breath sounds clear to auscultation              Comments: Decreased inspiratory effort Abdominal  GI exam deferred       Other Findings            Anesthetic Plan    ASA: 3  Anesthesia type: total IV anesthesia          Induction: Intravenous  Anesthetic plan and risks discussed with: Patient

## 2022-02-22 NOTE — PROGRESS NOTES
Pt called back and stated she is currently waiting for her colonoscopy procedure and that is why she missed a meal and skipped BP med. Remote Patient Monitoring Note      Date/Time:  2022 1:16 PM    Patient contacted regarding red alert received for survey response (missed meal and skipped BP med)   Verified patients name and  as identifiers. Ambulatory Care Manager contacted the patient by telephone regarding red alert. Interventions: left message for pt asking for a return call.  It is noted that pt had endoscopy today with Dr Marin Patient and most likely the reason she missed a meal and skipped bp med  [facility] PCP/Specialitist: no  Education Provided: no  Plan/Follow Up: continue to monitor

## 2022-02-22 NOTE — PROCEDURES
[facility]                  Colonoscopy Operative Report    2/22/2022      Dmitri Luo  [id number]  1950    Procedure Type:   Colonoscopy --screening     Indications:    Crohn's colitis (screening colon too), Personal history of colon polyps (screening only)     Pre-operative Diagnosis: see indication above    Post-operative Diagnosis:  See findings below    :  Tomasa Carrillo MD    Referring Provider: Ronit Finley MD      Sedation:  MAC anesthesia Propofol    Pre-Procedural Exam:      Airway: clear,  No airway problems anticipated  Heart: RRR, without gallops or rubs  Lungs: clear bilaterally without wheezes, crackles, or rhonchi  Abdomen: soft, nontender, nondistended, bowel sounds present  Mental Status: awake, alert and oriented to person, place and time     Procedure Details:  After informed consent was obtained with all risks and benefits of procedure explained and preoperative exam completed, the patient was taken to the endoscopy suite and placed in the left lateral decubitus position. Upon sequential sedation as per above, a digital rectal exam was performed . The Olympus videocolonoscope  was inserted in the rectum and carefully advanced to the cecum, which was identified by the ileocecal valve and appendiceal orifice. The cecum was identified by the ileocecal valve and appendiceal orifice. The quality of preparation was good. The colonoscope was slowly withdrawn with careful evaluation between folds. Retroflexion in the rectum was completed demonstrating internal hemorrhoids. Findings:   Rectum: normal, biopsied  Sigmoid: patchy colitis, mild---biopsied  Descending Colon:  patchy colitis, mild---biopsied  Transverse Colon:  patchy colitis, mild---biopsied.  Large lipoma  Ascending Colon:  patchy colitis, mild---biopsied  Cecum:  patchy colitis, mild  Terminal Ileum: not intubated      Specimen Removed:  biopsies of ascending, transverse, descending, sigmoid and rectum    Complications: None. EBL:  None. Impression:    Mild Crohn's colitis as above. Transverse colon lipoma. Internal hemorrhoids    Recommendations: --Await pathology. , -Repeat colonoscopy in 3 years. Regular diet. Resume normal medication(s). Discharge Disposition:  Home in the company of a  when able to ambulate. Franci Thornton MD    2/22/2022     P. Jalaine Goodpasture, MD  [facility], [address] [place] [address]  [phone]  [email or web address]

## 2022-02-22 NOTE — DISCHARGE INSTRUCTIONS
Shamika Zhao MD  [facility], [address]  [place], [address] [facility]  [phone]  [email or web address]    Francisco Saez  [id number]  1950    COLON DISCHARGE INSTRUCTIONS  Discomfort:  Redness at IV site- apply warm compress to area; if redness or soreness persist- contact your physician  There may be a slight amount of blood passed from the rectum  Gaseous discomfort- walking, belching will help relieve any discomfort  You may not operate a vehicle for 12 hours  You may not engage in an occupation involving machinery or appliances for rest of today  You may not drink alcoholic beverages for at least 12 hours  Avoid making any critical decisions for at least 24 hour  DIET:   Regular diet. - however -  remember your colon is empty and a heavy meal will produce gas. Avoid these foods:  vegetables, fried / greasy foods, carbonated drinks for today      ACTIVITY:  You may resume your normal daily activities it is recommended that you spend the remainder of the day resting -  avoid any strenuous activity. CALL M.D. ANY SIGN OF:   Increasing pain, nausea, vomiting  Abdominal distension (swelling)  New increased bleeding (oral or rectal)  Fever (chills)  Pain in chest area  Bloody discharge from nose or mouth  Shortness of breath     COLONOSCOPY FINDINGS:  Your colonoscopy showed: mild Crohn's colitis which was biopsied. Follow-up Instructions:   Call Dr. Shamika Zhao if any questions or problems. Telephone # [phone]  Dr. Brenner Cornea office will notify you of the biopsy results within 7 to 10 days. Should have a repeat colonoscopy in 3 years.

## 2022-02-22 NOTE — ANESTHESIA POSTPROCEDURE EVALUATION
Procedure(s):  COLONOSCOPY  needs rapid covid  COLON BIOPSY. total IV anesthesia    Anesthesia Post Evaluation        Patient location during evaluation: PACU  Note status: Adequate. Level of consciousness: responsive to verbal stimuli and sleepy but conscious  Pain management: satisfactory to patient  Airway patency: patent  Anesthetic complications: no  Cardiovascular status: acceptable  Respiratory status: acceptable  Hydration status: acceptable  Comments: +Post-Anesthesia Evaluation and Assessment    Patient: Barbara Dubin MRN: [id number]  SSN: [id number]   YOB: 1950  Age: 70 y.o. Sex: female      Cardiovascular Function/Vital Signs    BP (!) 177/73   Pulse 75   Temp 36.7 °C (98 °F)   Resp 20   Ht 5' 1\" (1.549 m)   Wt 95.9 kg (211 lb 6.4 oz)   SpO2 99%   Breastfeeding No   BMI 39.94 kg/m²     Patient is status post Procedure(s):  COLONOSCOPY  needs rapid covid  COLON BIOPSY. Nausea/Vomiting: Controlled. Postoperative hydration reviewed and adequate. Pain:  Pain Scale 1: Numeric (0 - 10) (02/22/22 1547)  Pain Intensity 1: 0 (02/22/22 1547)   Managed. Neurological Status: At baseline. Mental Status and Level of Consciousness: Arousable. Pulmonary Status:   O2 Device: None (Room air) (02/22/22 1552)   Adequate oxygenation and airway patent. Complications related to anesthesia: None    Post-anesthesia assessment completed. No concerns.     Signed By: Cheryl Irwin DO    2/22/2022  Post anesthesia nausea and vomiting:  controlled      INITIAL Post-op Vital signs:   Vitals Value Taken Time   /73 02/22/22 1552   Temp     Pulse 75 02/22/22 1552   Resp 20 02/22/22 1552   SpO2 99 % 02/22/22 1552

## 2022-02-28 ENCOUNTER — PATIENT OUTREACH (OUTPATIENT)
Dept: INTERNAL MEDICINE CLINIC | Age: 72
End: 2022-02-28

## 2022-02-28 NOTE — PROGRESS NOTES
3/1/22-left pt message that her return message was received and there is no need for her to return this call. Will continue to monitor RPM.    Remote Patient Monitoring Note      Date/Time:  2022 2:21 PM    Patient contacted regarding red alert received for pulse ox reading (pulse ox heart rate-97/108) and survey response (missed diabetes medication)   Verified patients name and  as identifiers. Ambulatory Care Manager contacted the patient by telephone regarding red alert. Interventions: left message asking pt to return call; left second message  Escalation PCP/Specialitist: no  Education Provided: no  Plan/Follow Up: will continue to monitor      Date Activity BP Pulse Ox Weight Glucose   2022  123/77/91  (8:51 AM) 97/108  (9:05 AM) 208.6  (8:17 AM) 168  (9:05 AM)   2022  114/71/80  (11:49 AM) 98/82  (11:51 AM) 211.8  (8:30 AM) 108  (11:50 AM)       -pt called back and left a message that she was fine and everything was okay. She wanted to do her vitals quickly because she had to go out. She has now taken her medicine and eaten lunch.

## 2022-03-01 ENCOUNTER — TELEPHONE (OUTPATIENT)
Dept: INTERNAL MEDICINE CLINIC | Age: 72
End: 2022-03-01

## 2022-03-01 NOTE — TELEPHONE ENCOUNTER
----- Message from Maira Haynes sent at 2/28/2022  5:18 PM EST -----  Subject: Message to Provider    QUESTIONS  Information for Provider? Pt received a call and would like a call back   from the office at their earliest convenience, was unable to reach the   office  ---------------------------------------------------------------------------  --------------  [address]  What is the best way for the office to contact you? OK to leave message on   voicemail  Preferred Call Back Phone Number?  [phone]  ---------------------------------------------------------------------------  --------------  SCRIPT ANSWERS  undefined

## 2022-03-02 ENCOUNTER — PATIENT OUTREACH (OUTPATIENT)
Dept: INTERNAL MEDICINE CLINIC | Age: 72
End: 2022-03-02

## 2022-03-02 NOTE — PROGRESS NOTES
Remote Patient Monitoring Note      Date/Time:  3/2/2022 2:27 PM    Patient contacted regarding red alert received for pulse ox reading (pulse ox heart rate-94/105) Pulse on bp-106/74/97. Verified patients name and  as identifiers. Ambulatory Care Manager contacted the patient by telephone regarding red alert. Interventions: pt has not been waiting to take bp and pulse until 1-2 hours after BP med and she has not been resting prior. Pt has asthma. Verified pt had not just taken albuterol jet neb or MDI prior to bp  Escalation PCP/Specialitist: no  Education Provided: advised pt to rest 15 minutes before checking bp and pulse ox.  Also advised to check bp 1-2 hours after taking bp med  Plan/Follow Up: continue to monitor

## 2022-03-03 ENCOUNTER — PATIENT OUTREACH (OUTPATIENT)
Dept: INTERNAL MEDICINE CLINIC | Age: 72
End: 2022-03-03

## 2022-03-03 NOTE — PROGRESS NOTES
Remote Patient Monitoring Note      Date/Time:  3/3/2022 2:25 PM    Patient contacted regarding red alert received for survey response (increased fatigue/tiredness past 24 hours)   Verified patients name and  as identifiers. Ambulatory Care Manager contacted the patient by telephone regarding red alert. Interventions: pt stated yesterday she was tired and slept a lot, but today her energy is back. Pt has lost >4 lbs since beginning program 2/3/22.   Escalation PCP/Specialitist: no  Education Provided: none  Plan/Follow Up: continue to monitor

## 2022-03-10 ENCOUNTER — PATIENT OUTREACH (OUTPATIENT)
Dept: INTERNAL MEDICINE CLINIC | Age: 72
End: 2022-03-10

## 2022-03-10 NOTE — PROGRESS NOTES
Goals      Patient/Family verbalizes understanding of self-management of chronic disease, HTN, T2D using remote pt monitoring. 3/10/22- DKW  Remote Patient Monitoring Note    Date/Time:  3/10/2022 11:39 AM    Patient contacted regarding red alert received for Q)  Are you skipping any of your blood pressure medicine? A)  Y  03/10/2022 9:45:08 am   Verified patients name and  as identifiers. Ambulatory Care Manager contacted the patient by telephone regarding red alert. Interventions: left message asking for a return call to Taras Richmond at [phone]. Pt called back and stated she forgot to take her medicine because she took her grandson to work at 4 AM. The RPM was a reminder to take it. Escalation PCP/Specialitist: no  Education Provided: no  Plan/Follow Up: continue to monitor       22-dkw  -called pt to discuss RPM and no metrics in HRS for her yet today; pt stated she turned on the tablet and she is going to start the program tomorrow  -will continue to monitor    2/3/22-w  -Offered patient enrollment in the [facility] Patient Monitoring (RPM) program for in home monitoring for Diabetes and HTN.  Patient accepted RPM services.  -will monitor

## 2022-03-18 ENCOUNTER — PATIENT OUTREACH (OUTPATIENT)
Dept: INTERNAL MEDICINE CLINIC | Age: 72
End: 2022-03-18

## 2022-03-18 NOTE — PROGRESS NOTES
Goals      Patient/Family verbalizes understanding of self-management of chronic disease, HTN, T2D using remote pt monitoring. 3/18/22-[facility] Remote Patient Monitoring Note    Date/Time:  3/18/2022 12:24 PM    Patient contacted regarding red alert received for    116/71/111  123/77/120   Verified patients name and  as identifiers. Ambulatory Care Manager contacted the patient by telephone regarding red alert. Interventions: left message asking pt to return call regarding her elevated heart rate this morning and asking pt to complete survey. Pt called back and stated she was rushing around to get her numbers in before she left for a  and that is why her heart rate was elevated. Expressed condolences and understanding. Escalation PCP/Specialitist: no  Education Provided: rest before taking BP  Plan/Follow Up: continue to monitor     3/10/22-  Remote Patient Monitoring Note    Date/Time:  3/10/2022 11:39 AM    Patient contacted regarding red alert received for Q)  Are you skipping any of your blood pressure medicine? A)  Y  03/10/2022 9:45:08 am   Verified patients name and  as identifiers. Ambulatory Care Manager contacted the patient by telephone regarding red alert. Interventions: left message asking for a return call to [facility] at [phone]. Pt called back and stated she forgot to take her medicine because she took her grandson to work at 4 AM. The RPM was a reminder to take it. Escalation PCP/Specialitist: no  Education Provided: no  Plan/Follow Up: continue to monitor       22-w  -called pt to discuss RPM and no metrics in HRS for her yet today; pt stated she turned on the tablet and she is going to start the program tomorrow  -will continue to monitor    2/3/22-dkw  -Offered patient enrollment in the [facility] Patient Monitoring (RPM) program for in home monitoring for Diabetes and HTN.  Patient accepted RPM services.  -will monitor

## 2022-03-19 PROBLEM — E66.01 SEVERE OBESITY (HCC): Status: ACTIVE | Noted: 2018-12-31

## 2022-03-28 ENCOUNTER — PATIENT OUTREACH (OUTPATIENT)
Dept: INTERNAL MEDICINE CLINIC | Age: 72
End: 2022-03-28

## 2022-03-28 NOTE — PROGRESS NOTES
RPM data from start of program 2/3/22 to today 3/28/22:          Goals      Patient/Family verbalizes understanding of self-management of chronic disease, HTN, T2D using remote pt monitoring. 3/28/22-  -all RPM data sent to Dr Kade Bautista     3/18/22-dk Remote Patient Monitoring Note    Date/Time:  3/18/2022 12:24 PM    Patient contacted regarding red alert received for    116/71/111  123/77/120   Verified patients name and  as identifiers. Ambulatory Care Manager contacted the patient by telephone regarding red alert. Interventions: left message asking pt to return call regarding her elevated heart rate this morning and asking pt to complete survey. Pt called back and stated she was rushing around to get her numbers in before she left for a  and that is why her heart rate was elevated. Expressed condolences and understanding. Escalation PCP/Specialitist: no  Education Provided: rest before taking BP  Plan/Follow Up: continue to monitor     3/10/22- DKW Remote Patient Monitoring Note    Date/Time:  3/10/2022 11:39 AM    Patient contacted regarding red alert received for Q)  Are you skipping any of your blood pressure medicine? A)  Y  03/10/2022 9:45:08 am   Verified patients name and  as identifiers. Ambulatory Care Manager contacted the patient by telephone regarding red alert. Interventions: left message asking for a return call to [facility] at [phone]. Pt called back and stated she forgot to take her medicine because she took her grandson to work at 4 AM. The RPM was a reminder to take it.    Escalation PCP/Specialitist: no  Education Provided: no  Plan/Follow Up: continue to monitor       22-  -called pt to discuss RPM and no metrics in HRS for her yet today; pt stated she turned on the tablet and she is going to start the program tomorrow  -will continue to monitor    2/3/22-dkw  -Offered patient enrollment in the [facility] Remote Patient Monitoring (RPM) program for in home monitoring for Diabetes and HTN.  Patient accepted RPM services.  -will monitor

## 2022-04-01 ENCOUNTER — PATIENT OUTREACH (OUTPATIENT)
Dept: CASE MANAGEMENT | Age: 72
End: 2022-04-01

## 2022-04-01 RX ORDER — ALBUTEROL SULFATE 0.83 MG/ML
1.25 SOLUTION RESPIRATORY (INHALATION)
Qty: 30 NEBULE | Refills: 5 | Status: SHIPPED | OUTPATIENT
Start: 2022-04-01 | End: 2022-04-26 | Stop reason: SDUPTHER

## 2022-04-01 RX ORDER — LISINOPRIL AND HYDROCHLOROTHIAZIDE 20; 25 MG/1; MG/1
TABLET ORAL
Qty: 90 TABLET | Refills: 3 | Status: SHIPPED | OUTPATIENT
Start: 2022-04-01

## 2022-04-01 RX ORDER — ALBUTEROL SULFATE 90 UG/1
AEROSOL, METERED RESPIRATORY (INHALATION)
Qty: 18 G | Refills: 11 | Status: SHIPPED | OUTPATIENT
Start: 2022-04-01

## 2022-04-01 RX ORDER — ATORVASTATIN CALCIUM 40 MG/1
40 TABLET, FILM COATED ORAL DAILY
Qty: 90 TABLET | Refills: 3 | Status: SHIPPED | OUTPATIENT
Start: 2022-04-01

## 2022-04-01 RX ORDER — METFORMIN HYDROCHLORIDE 500 MG/1
TABLET ORAL
Qty: 360 TABLET | Refills: 3 | Status: SHIPPED | OUTPATIENT
Start: 2022-04-01

## 2022-04-01 NOTE — TELEPHONE ENCOUNTER
Future Appointments:  Future Appointments   Date Time Provider Stacia Baez   2022 11:15 AM Leela Madrid MD [facility] BS AMB        Last Appointment With Me:  2/3/2022     Requested Prescriptions     Pending Prescriptions Disp Refills    atorvastatin (LIPITOR) 40 mg tablet 90 Tablet 3     Sig: Take 1 Tablet by mouth daily.  metFORMIN (GLUCOPHAGE) 500 mg tablet 360 Tablet 3     Sig: TAKE 2 TABLETs BY MOUTH TWICE DAILY WITH MEALS    lisinopril-hydroCHLOROthiazide (PRINZIDE, ZESTORETIC) 20-25 mg per tablet 90 Tablet 3     Sig: TAKE 1 TABLET BY MOUTH DAILY    albuterol (PROVENTIL HFA, VENTOLIN HFA, PROAIR HFA) 90 mcg/actuation inhaler 18 g 11     Sig: INHALE 1 PUFF BY MOUTH EVERY 4 HOURS AS NEEDED FOR WHEEZING    albuterol (PROVENTIL VENTOLIN) 2.5 mg /3 mL (0.083 %) nebu 30 Nebule 5     Si.5 mL by Nebulization route every four (4) hours as needed for Wheezing.

## 2022-04-01 NOTE — TELEPHONE ENCOUNTER
Caller states transferring pharmacies. Needs scripts sent to new [facility]: [facility]    Caller requests Refill of:  1. atorvastatin (LIPITOR) 40 mg tablet  2. metFORMIN (GLUCOPHAGE) 500 mg tablet  3. lisinopril-hydroCHLOROthiazide (PRINZIDE, ZESTORETIC) 20-25 mg per tablet  4. albuterol (PROVENTIL HFA, VENTOLIN HFA, PROAIR HFA) 90 mcg/actuation inhaler  5. albuterol (PROVENTIL VENTOLIN) 2.5 mg /3 mL (0.083 %) nebu        Please send to:  [address]      Visit Appointment History:   Future:   8/16/22  Previous: 2/3/22          Caller was advised that Meds will be refilled as soon as possible, however there can be a 48-72 business hour turn around on refill requests.

## 2022-04-01 NOTE — PROGRESS NOTES
Remote Patient Monitoring Note      Date/Time:  2022 11:14 AM    LPN contacted patient by telephone regarding red alert received for blood pressure heart rate (104). Verified patients name and  as identifiers. Background: Patient on RPM for HTN and DM. Received red alert for . Spoke with patient and she states she had taken vital signs right after doing activity. States she has taken her medications as ordered, has no SOB, Dizziness, CP. Clinical Interventions: Reviewed and followed up on alerts and treatments-New vital signs obtained with BP of 116/71 and HR of 80. Reminded patient to make sure she is drinking plenty of fluids. Plan/Follow Up: Will continue to review, monitor and address alerts with follow up based on severity of symptoms and risk factors.

## 2022-04-06 ENCOUNTER — PATIENT OUTREACH (OUTPATIENT)
Dept: CASE MANAGEMENT | Age: 72
End: 2022-04-06

## 2022-04-06 NOTE — PROGRESS NOTES
Remote Patient Monitoring Note      Date/Time:  2022 8:35 AM    LPN contacted patient by telephone regarding red alert received for pulse ox heart rate (105). Verified patients name and  as identifiers. Background: Pt on RPM for HTN and DM. Clinical Interventions: Reviewed and followed up on alerts and treatments- Will recheck heart rate when she gets home. Patient states that she was rushing to get out the door this morning and didn't have time to recheck reading. Denies any HA, lightheadedness, dizziness, SOB. Plan/Follow Up: Will continue to review, monitor and address alerts with follow up based on severity of symptoms and risk factors.

## 2022-04-14 ENCOUNTER — PATIENT OUTREACH (OUTPATIENT)
Dept: CASE MANAGEMENT | Age: 72
End: 2022-04-14

## 2022-04-14 NOTE — PROGRESS NOTES
Patient is currently enrolled in the [facility] Patient Monitoring program for management of Diabetes and HTN. Reached out to patient regarding BP of 94/59. Patient denies any dizziness at this time. States she has just felt a little tired lately. States she is taking Lisinopril/HCTZ as ordered. Getting ready to eat breakfast and then will recheck BP. Will continue to monitor with RPM.    UPDATE: 12:48 Pt updated readings obtained.   Will continue to monitor with RPM.

## 2022-04-26 RX ORDER — ALBUTEROL SULFATE 0.83 MG/ML
1.25 SOLUTION RESPIRATORY (INHALATION)
Qty: 30 NEBULE | Refills: 5 | Status: SHIPPED | OUTPATIENT
Start: 2022-04-26 | End: 2022-06-01

## 2022-04-26 NOTE — TELEPHONE ENCOUNTER
Future Appointments:  Future Appointments   Date Time Provider Stacia Baez   2022 11:15 AM Addison Devries MD [facility] BS AMB        Last Appointment With Me:  2/3/2022     Requested Prescriptions     Pending Prescriptions Disp Refills    albuterol (PROVENTIL VENTOLIN) 2.5 mg /3 mL (0.083 %) nebu 30 Nebule 5     Si.5 mL by Nebulization route every four (4) hours as needed for Wheezing.

## 2022-05-03 ENCOUNTER — PATIENT OUTREACH (OUTPATIENT)
Dept: INTERNAL MEDICINE CLINIC | Age: 72
End: 2022-05-03

## 2022-05-03 NOTE — Clinical Note
Pt has been in Remote Patient Monitoring program for three months. Her data 3/29-5/3 is in progress note. Please review. I think she is stable and ready for graduation. Please advise. Thanks.

## 2022-05-03 NOTE — PROGRESS NOTES
Remote Patient Monitoring data for review:            Remote Patient Monitoring Care Plan      Date/Time:  5/3/2022 4:51 PM    Patient is currently enrolled in the [facility] Patient Monitoring (RPM) program for in home monitoring for Diabetes and HTN. Patient will be monitoring activity, blood pressure , glucose, weight, survey questions and disease specific education modules  daily. Interventions: Reviewed metrics needed for daily management of chronic disease-HTN, diabetes     Short Term Goal: Patient will engage in Remote Patient Monitoring each day to develop the skills necessary for self management. Long Term Goal: Patient will be able to complete self management skills daily for better disease management.      Care Team Responsibilities:   [facility] Alerts will be reviewed daily and addressed within 2-4 hours during operational hours (Monday -Friday 9 am-4 pm)   Alert response and intervention documented in patient medical record    Patient monitored over  days   Discharge from program based on Self-Management Readiness assessment

## 2022-05-04 ENCOUNTER — PATIENT OUTREACH (OUTPATIENT)
Dept: CASE MANAGEMENT | Age: 72
End: 2022-05-04

## 2022-05-04 NOTE — PROGRESS NOTES
Remote Patient Monitoring Note      Date/Time:  2022 1:06 PM    LPN contacted patient by telephone regarding red alert received for survey response (pt answers yes to missed scheduled diabetic medication. ). Verified patients name and  as identifiers. Background: Pt enrolled in RPM r/t HTN, DM. Clinical Interventions: Reviewed and followed up on alerts and treatments-patient took medication late. Glucose 151 this morning. Denies any HA, dizziness, nausea. Plan/Follow Up: Will continue to review, monitor and address alerts with follow up based on severity of symptoms and risk factors.

## 2022-05-09 NOTE — PROGRESS NOTES
Remote Patient Monitoring Graduation      Date/Time:  2022 9:36 AM    Patient has graduated from the Remote Patient Monitoring program on 2022. RPM goals have been met at this time. Patient as been provided instruction on process to return RPM equipment and RPM has been deactivated. Patient has Ambulatory Care Manager's contact information for any further questions, concerns, or needs. Patient has graduated from the Complex Case Management  program on 22-RPM for HTN/diabetes. Patient/family has the ability to self-manage at this time. Care management goals have been completed. No further Ambulatory Care Manager follow up scheduled. Goals Addressed                 This Visit's Progress     COMPLETED: Patient/Family verbalizes understanding of self-management of chronic disease, HTN, T2D using remote pt monitoring. On track     22-dkw  -pt stated she is well, has lost a few lbs and hopes to keep up the same schedule at home as she learned a lot from the program  -pt has graduated from Remote Patient Monitoring; she will drop kit off at [facility] today  -all data printed and sent to [facility] in-office scanning    22-dkw  -left pt message about RPM graduation and asked for a return call to [facility] at [phone]      Yes bp readings are good, can stop RPM    Message text      Coral Nuñez MD 21 hours ago (4:35 PM) 22     Just running this past you again-Pt has been in Remote Patient Monitoring program for three months. Her data 3/29-5/3 is in progress note. Please review. I think she is stable and ready for graduation. Please advise. Thanks. 3/28/22-dkw  -all RPM data sent to Dr Jakub Obando     3/18/22-dkw Remote Patient Monitoring Note    Date/Time:  3/18/2022 12:24 PM    Patient contacted regarding red alert received for    116/71/111  123/77/120   Verified patients name and  as identifiers.     Ambulatory Care Manager contacted the patient by telephone regarding red alert.    Interventions: left message asking pt to return call regarding her elevated heart rate this morning and asking pt to complete survey. Pt called back and stated she was rushing around to get her numbers in before she left for a  and that is why her heart rate was elevated. Expressed condolences and understanding. Escalation PCP/Specialitist: no  Education Provided: rest before taking BP  Plan/Follow Up: continue to monitor     3/10/22- [facility] Remote Patient Monitoring Note    Date/Time:  3/10/2022 11:39 AM    Patient contacted regarding red alert received for Q)  Are you skipping any of your blood pressure medicine? A)  Y  03/10/2022 9:45:08 am   Verified patients name and  as identifiers. Ambulatory Care Manager contacted the patient by telephone regarding red alert. Interventions: left message asking for a return call to Alexia at [phone]. Pt called back and stated she forgot to take her medicine because she took her grandson to work at 4 AM. The RPM was a reminder to take it. Escalation PCP/Specialitist: no  Education Provided: no  Plan/Follow Up: continue to monitor       22-dkw  -called pt to discuss RPM and no metrics in HRS for her yet today; pt stated she turned on the tablet and she is going to start the program tomorrow  -will continue to monitor    2/3/22-dkw  -Offered patient enrollment in the [facility] [place] Monitoring (RPM) program for in home monitoring for Diabetes and HTN. Patient accepted RPM services.  -will monitor               Patient has Ambulatory Care Manager's contact information for any further questions, concerns, or needs.   Patients upcoming visits:    Future Appointments   Date Time Provider Stacia Baez   2022 11:15 AM Travis Horton MD [facility] BS AMB

## 2022-06-01 RX ORDER — ALBUTEROL SULFATE 0.83 MG/ML
SOLUTION RESPIRATORY (INHALATION)
Qty: 540 ML | Refills: 11 | Status: SHIPPED | OUTPATIENT
Start: 2022-06-01

## 2022-08-05 ENCOUNTER — TRANSCRIBE ORDER (OUTPATIENT)
Dept: SCHEDULING | Age: 72
End: 2022-08-05

## 2022-08-05 DIAGNOSIS — Z12.31 VISIT FOR SCREENING MAMMOGRAM: Primary | ICD-10-CM

## 2022-08-16 ENCOUNTER — OFFICE VISIT (OUTPATIENT)
Dept: INTERNAL MEDICINE CLINIC | Age: 72
End: 2022-08-16
Payer: MEDICARE

## 2022-08-16 VITALS
SYSTOLIC BLOOD PRESSURE: 120 MMHG | RESPIRATION RATE: 16 BRPM | BODY MASS INDEX: 40.22 KG/M2 | HEIGHT: 61 IN | DIASTOLIC BLOOD PRESSURE: 80 MMHG | WEIGHT: 213 LBS | HEART RATE: 90 BPM | TEMPERATURE: 97.3 F | OXYGEN SATURATION: 99 %

## 2022-08-16 DIAGNOSIS — E11.9 TYPE 2 DIABETES MELLITUS WITHOUT COMPLICATION, WITHOUT LONG-TERM CURRENT USE OF INSULIN (HCC): Primary | ICD-10-CM

## 2022-08-16 DIAGNOSIS — E78.00 PURE HYPERCHOLESTEROLEMIA: ICD-10-CM

## 2022-08-16 DIAGNOSIS — I10 HYPERTENSION, UNSPECIFIED TYPE: ICD-10-CM

## 2022-08-16 PROCEDURE — 99213 OFFICE O/P EST LOW 20 MIN: CPT | Performed by: INTERNAL MEDICINE

## 2022-08-16 PROCEDURE — G8754 DIAS BP LESS 90: HCPCS | Performed by: INTERNAL MEDICINE

## 2022-08-16 PROCEDURE — G8417 CALC BMI ABV UP PARAM F/U: HCPCS | Performed by: INTERNAL MEDICINE

## 2022-08-16 PROCEDURE — G8399 PT W/DXA RESULTS DOCUMENT: HCPCS | Performed by: INTERNAL MEDICINE

## 2022-08-16 PROCEDURE — G8427 DOCREV CUR MEDS BY ELIG CLIN: HCPCS | Performed by: INTERNAL MEDICINE

## 2022-08-16 PROCEDURE — G8432 DEP SCR NOT DOC, RNG: HCPCS | Performed by: INTERNAL MEDICINE

## 2022-08-16 PROCEDURE — G8536 NO DOC ELDER MAL SCRN: HCPCS | Performed by: INTERNAL MEDICINE

## 2022-08-16 PROCEDURE — 1101F PT FALLS ASSESS-DOCD LE1/YR: CPT | Performed by: INTERNAL MEDICINE

## 2022-08-16 PROCEDURE — 1090F PRES/ABSN URINE INCON ASSESS: CPT | Performed by: INTERNAL MEDICINE

## 2022-08-16 PROCEDURE — G9899 SCRN MAM PERF RSLTS DOC: HCPCS | Performed by: INTERNAL MEDICINE

## 2022-08-16 PROCEDURE — 3017F COLORECTAL CA SCREEN DOC REV: CPT | Performed by: INTERNAL MEDICINE

## 2022-08-16 PROCEDURE — 2022F DILAT RTA XM EVC RTNOPTHY: CPT | Performed by: INTERNAL MEDICINE

## 2022-08-16 PROCEDURE — G8752 SYS BP LESS 140: HCPCS | Performed by: INTERNAL MEDICINE

## 2022-08-16 NOTE — PROGRESS NOTES
1. \"Have you been to the ER, urgent care clinic since your last visit? Hospitalized since your last visit? \" No    2. \"Have you seen or consulted any other health care providers outside of the [address] [place] [address] since your last visit? \" No     3. For patients aged 39-70: Has the patient had a colonoscopy / FIT/ Cologuard? Yes - Care Gap present. Most recent result on file      If the patient is female:    4. For patients aged 41-77: Has the patient had a mammogram within the past 2 years? Yes - Care Gap present. Most recent result on file      5. For patients aged 21-65: Has the patient had a pap smear?  NA - based on age or sex

## 2022-08-16 NOTE — PATIENT INSTRUCTIONS
Office Policies    Phone calls/patient messages:            Please allow up to 24 hours for someone in the office to contact you about your call or message. Be mindful your provider may be out of the office or your message may require further review. We encourage you to use [employer] for your messages as this is a faster, more efficient way to communicate with our office                         Medication Refills:            Prescription medications require 48-72 business hours to process. We encourage you to use [employer] for your refills. For controlled medications: Please allow 72 business hours to process. Certain medications may require you to  a written prescription at our office. NO narcotic/controlled medications will be prescribed after 4pm Monday through Friday or on weekends              Form/Paperwork Completion:            Please note a $25 fee may incur for all paperwork for completed by our providers. We ask that you allow 7-10 business days. Pre-payment is due prior to picking up/faxing the completed form. You may also download your forms to [employer] to have your doctor print off.

## 2022-08-16 NOTE — PROGRESS NOTES
HISTORY OF PRESENT ILLNESS  Adolph Marcos is a 70 y.o. female. HPI  F/u DM-2 htn hld obesity bmi 40 hx mild asthma Crohns colitis-asymptomatic  Last a1c 7.0 LDL 86  Avg fsbs around 130 in AM  Colonoscopy this year-mild crohns colitis--repeat in 3 years. Asymptomatic-no tx needed  Last OV    Last a1c 7.1   fsbs around 140 in am on average  Asthma symptoms-mild per pt-albuterol prn     Weight down 8 lbs  Walks dog for exercise  Retired from [employer] 2 months ago  Scheduled for colonoscopy later this month    Patient Active Problem List    Diagnosis Date Noted    Severe obesity ([facility] 12/31/2018    Type II or unspecified type diabetes mellitus without mention of complication, not stated as uncontrolled 09/03/2013    Obesity 05/10/2011    HTN (hypertension) 11/08/2010    Asthma 02/21/2010    Pure hypercholesterolemia 02/21/2010    Colon polyp 02/21/2010    Fibrocystic breast 02/21/2010    Fibroid uterus 02/21/2010    Colitis 02/21/2010     Current Outpatient Medications   Medication Sig Dispense Refill    albuterol (PROVENTIL VENTOLIN) 2.5 mg /3 mL (0.083 %) nebu USE 1 VIAL VIA NEBULIZER  EVERY 4 HOURS AS NEEDED FOR WHEEZING 540 mL 11    atorvastatin (LIPITOR) 40 mg tablet Take 1 Tablet by mouth daily. 90 Tablet 3    metFORMIN (GLUCOPHAGE) 500 mg tablet TAKE 2 TABLETs BY MOUTH TWICE DAILY WITH MEALS 360 Tablet 3    lisinopril-hydroCHLOROthiazide (PRINZIDE, ZESTORETIC) 20-25 mg per tablet TAKE 1 TABLET BY MOUTH DAILY 90 Tablet 3    albuterol (PROVENTIL HFA, VENTOLIN HFA, PROAIR HFA) 90 mcg/actuation inhaler INHALE 1 PUFF BY MOUTH EVERY 4 HOURS AS NEEDED FOR WHEEZING 18 g 11    triamcinolone acetonide (KENALOG) 0.1 % topical cream Apply  to affected area two (2) times a day. use thin layer 15 g 0    calcium-cholecalciferol, d3, 600-125 mg-unit tab Take 1 Tab by mouth two (2) times a day. Aspirin, Buffered 81 mg tab Take  by mouth.       glucose blood VI test strips (TRUETRACK TEST) strip 1 Each by Does Not Apply route Daily (before breakfast). 1 Package 11    FA/MV,CA,FE,MIN/LYCOPENE/LUT (MULTIVITAL PO) Take  by mouth daily. No Known Allergies   Lab Results   Component Value Date/Time    WBC 5.7 07/06/2021 11:51 AM    HGB 13.6 07/06/2021 11:51 AM    HCT 44.1 07/06/2021 11:51 AM    PLATELET 618 25/18/5718 11:51 AM    MCV 94.2 07/06/2021 11:51 AM     Lab Results   Component Value Date/Time    Cholesterol, total 169 02/03/2022 11:02 AM    Cholesterol (POC) 184 03/17/2015 11:23 AM    HDL Cholesterol 65 02/03/2022 11:02 AM    LDL, calculated 86.2 02/03/2022 11:02 AM    LDL Cholesterol (POC) 117 03/17/2015 11:23 AM    Triglyceride 89 02/03/2022 11:02 AM    Triglycerides (POC) 76 03/17/2015 11:23 AM    CHOL/HDL Ratio 2.6 02/03/2022 11:02 AM     Lab Results   Component Value Date/Time    GFR est non-AA >60 02/03/2022 11:02 AM    GFR est AA >60 02/03/2022 11:02 AM    Creatinine 0.85 02/03/2022 11:02 AM    BUN 10 02/03/2022 11:02 AM    Sodium 142 02/03/2022 11:02 AM    Potassium 4.1 02/03/2022 11:02 AM    Chloride 107 02/03/2022 11:02 AM    CO2 32 02/03/2022 11:02 AM        ROS    Physical Exam  Vitals and nursing note reviewed. Constitutional:       Appearance: Normal appearance. She is well-developed. She is obese. Comments: Appears stated age   Cardiovascular:      Rate and Rhythm: Normal rate and regular rhythm. Heart sounds: Normal heart sounds. No murmur heard. No friction rub. No gallop. Pulmonary:      Effort: Pulmonary effort is normal. No respiratory distress. Breath sounds: Normal breath sounds. No wheezing. Abdominal:      General: Bowel sounds are normal.      Palpations: Abdomen is soft. Neurological:      General: No focal deficit present. Mental Status: She is alert.       Comments:      Diabetic foot exam performed by Salo Marcos MD       Measurement  Response Nurse Comment Physician Comment  Monofilament  R - normal sensation with micro filament  L - normal sensation with micro filament    Pulse DP R - 2+ (normal)  L - 2+ (normal)    Pulse TP R - 2+ (normal)  L - 2+ (normal)    Structural deformity R - None  L - None    Skin Integrity / Deformity R - None  L - None       Reviewed by:              ASSESSMENT and PLAN    ICD-10-CM ICD-9-CM    1. Type 2 diabetes mellitus without complication, without long-term current use of insulin (HCC)  E11.9 250.00 HEMOGLOBIN A1C WITH EAG       DIABETES FOOT EXAM      HEMOGLOBIN A1C WITH EAG  Continue metformin and diet      2. Hypertension, unspecified type  I10 401.9 CBC W/O DIFF      METABOLIC PANEL, BASIC      METABOLIC PANEL, BASIC      CBC W/O DIFF  controlled      3.  Pure hypercholesterolemia  E78.00 272.0 TSH 3RD GENERATION      TSH 3RD GENERATION  LDL at goal on statin

## 2022-08-17 LAB
ANION GAP SERPL CALC-SCNC: 8 MMOL/L (ref 5–15)
BUN SERPL-MCNC: 10 MG/DL (ref 6–20)
BUN/CREAT SERPL: 13 (ref 12–20)
CALCIUM SERPL-MCNC: 9.2 MG/DL (ref 8.5–10.1)
CHLORIDE SERPL-SCNC: 106 MMOL/L (ref 97–108)
CO2 SERPL-SCNC: 30 MMOL/L (ref 21–32)
CREAT SERPL-MCNC: 0.8 MG/DL (ref 0.55–1.02)
ERYTHROCYTE [DISTWIDTH] IN BLOOD BY AUTOMATED COUNT: 13.8 % (ref 11.5–14.5)
EST. AVERAGE GLUCOSE BLD GHB EST-MCNC: 154 MG/DL
GLUCOSE SERPL-MCNC: 106 MG/DL (ref 65–100)
HBA1C MFR BLD: 7 % (ref 4–5.6)
HCT VFR BLD AUTO: 44.4 % (ref 35–47)
HGB BLD-MCNC: 13.4 G/DL (ref 11.5–16)
MCH RBC QN AUTO: 29.5 PG (ref 26–34)
MCHC RBC AUTO-ENTMCNC: 30.2 G/DL (ref 30–36.5)
MCV RBC AUTO: 97.6 FL (ref 80–99)
NRBC # BLD: 0 K/UL (ref 0–0.01)
NRBC BLD-RTO: 0 PER 100 WBC
PLATELET # BLD AUTO: 189 K/UL (ref 150–400)
POTASSIUM SERPL-SCNC: 4.1 MMOL/L (ref 3.5–5.1)
RBC # BLD AUTO: 4.55 M/UL (ref 3.8–5.2)
SODIUM SERPL-SCNC: 144 MMOL/L (ref 136–145)
TSH SERPL DL<=0.05 MIU/L-ACNC: 2.11 UIU/ML (ref 0.36–3.74)
WBC # BLD AUTO: 7.3 K/UL (ref 3.6–11)

## 2022-09-06 ENCOUNTER — HOSPITAL ENCOUNTER (OUTPATIENT)
Dept: MAMMOGRAPHY | Age: 72
Discharge: HOME | End: 2022-09-06
Attending: INTERNAL MEDICINE
Payer: MEDICARE

## 2022-09-06 DIAGNOSIS — Z12.31 VISIT FOR SCREENING MAMMOGRAM: ICD-10-CM

## 2022-09-06 PROCEDURE — 77067 SCR MAMMO BI INCL CAD: CPT

## 2022-10-21 ENCOUNTER — TELEPHONE (OUTPATIENT)
Dept: PHARMACY | Age: 72
End: 2022-10-21

## 2022-10-21 NOTE — TELEPHONE ENCOUNTER
[facility] CLINICAL PHARMACY: ADHERENCE REVIEW  Identified care gap per [place]: fills at [facility]: ACE/ARB, Diabetes, and Statin adherence    Last Visit: 8/16/2022      ASSESSMENT  ACE/ARB ADHERENCE    Insurance Records claims through 10/21/2022 (2021 [place] = NA%; YTD PDC =  100%; Passed in 2022): Lisinopril/HCTZ last filled on 6/10/2022 for 90 day supply. Next refill due: 9/8/2022-DUE 11/1/2022   2 refills remaining. Billed through [place]    BP Readings from Last 3 Encounters:   08/16/22 120/80   02/22/22 (!) 177/73   02/03/22 (!) 140/78     Estimated Creatinine Clearance: 68.5 mL/min (by C-G formula based on SCr of 0.8 mg/dL). DIABETES ADHERENCE    Insurance Records claims through 10/21/2022 (2021 [place] = NA%; TRAVON [place] =  88%; Potential Fail Date: 11/18/2022 ):   Metformin last filled on 5/31/2022 for 90 day supply. Next refill due: 9/25/2022-PAST DUE 26 days  2 refills remaining. Billed through [facility] Rx Value Date/Time    Hemoglobin A1c 7.0 (H) 08/16/2022 12:17 PM    Hemoglobin A1c 7.0 (H) 02/03/2022 11:02 AM    Hemoglobin A1c 7.1 (H) 07/06/2021 11:51 AM    Hemoglobin A1c (POC) 7.0 (A) 07/01/2019 11:35 AM    Hemoglobin A1c (POC) 6.9 (A) 05/23/2016 03:15 PM    Hemoglobin A1c (POC) 7.2 (A) 01/25/2016 03:46 PM     NOTE A1c <9%    [id number] Records claims through 10/21/2022 (2021 [place] = NA%; TRA[place] =  88%; Potential Fail Date: 11/18/2022 ):   Atorvastatin last filled on 5/31/2022 for 90 day supply. Next refill due: 8/29/2022-due 9/25/22 PAST DUE 26 days  2 refills remaining.  Billed through [facility] Rx Value Date/Time    Cholesterol, total 169 02/03/2022 11:02 AM    Cholesterol (POC) 184 03/17/2015 11:23 AM    HDL Cholesterol 65 02/03/2022 11:02 AM    HDL Cholesterol (POC) 52 03/17/2015 11:23 AM    LDL Cholesterol (POC) 117 03/17/2015 11:23 AM    LDL, calculated 86.2 02/03/2022 11:02 AM    VLDL, calculated 17.8 02/03/2022 11:02 AM    Triglyceride 89 02/03/2022 11:02 AM    Triglycerides (POC) 76 03/17/2015 11:23 AM    CHOL/HDL Ratio 2.6 02/03/2022 11:02 AM     ALT (SGPT)   Date Value Ref Range Status   02/03/2022 23 12 - 78 U/L Final     AST (SGOT)   Date Value Ref Range Status   02/03/2022 15 15 - 37 U/L Final     The 10-year ASCVD risk score (Jamie DK, et al., 2019) is: 22.2%    Values used to calculate the score:      Age: 70 years      Sex: Female      Is Non- : Yes      Diabetic: Yes      Tobacco smoker: No      Systolic Blood Pressure: 898 mmHg      Is BP treated: Yes      HDL Cholesterol: 65 MG/DL      Total Cholesterol: 169 MG/DL     PLAN  The following are interventions that have been identified:  - Patient overdue refilling Atorvastatin and metformin and due for lisinopril/hctz and active on home medication list    Filled 10/23  Future Appointments   Date Time Provider Stacia Baez   3/6/2023  8:30 AM Kymberly Bowden MD [address], PharmD, [address] [place] [address], toll free: [phone] Option 2  For Pharmacy [address] [place] [address] in place: No  Recommendation Provided To: Pharmacy: 3  Intervention Detail: Adherence Monitoring: 3  Gap Closed?: Yes  Intervention Accepted By: Pharmacy: 3  Time Spent (min): 15

## 2022-11-08 ENCOUNTER — TELEPHONE (OUTPATIENT)
Dept: INTERNAL MEDICINE CLINIC | Age: 72
End: 2022-11-08

## 2022-11-08 NOTE — TELEPHONE ENCOUNTER
Patient states she's returning your call. Please call to re-sched CPE appt as she would like something Prior to 1st Available in June 2023 at time of call.  Thank you

## 2023-03-12 RX ORDER — ALBUTEROL SULFATE 90 UG/1
AEROSOL, METERED RESPIRATORY (INHALATION)
Qty: 25.5 G | Refills: 3 | Status: SHIPPED | OUTPATIENT
Start: 2023-03-12

## 2023-03-12 RX ORDER — ATORVASTATIN CALCIUM 40 MG/1
40 TABLET, FILM COATED ORAL DAILY
Qty: 90 TABLET | Refills: 3 | Status: SHIPPED | OUTPATIENT
Start: 2023-03-12

## 2023-03-12 RX ORDER — LISINOPRIL AND HYDROCHLOROTHIAZIDE 20; 25 MG/1; MG/1
TABLET ORAL
Qty: 90 TABLET | Refills: 3 | Status: SHIPPED | OUTPATIENT
Start: 2023-03-12
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
## 2023-03-15 ENCOUNTER — OFFICE VISIT (OUTPATIENT)
Dept: INTERNAL MEDICINE CLINIC | Age: 73
End: 2023-03-15
Payer: MEDICARE

## 2023-03-15 VITALS
RESPIRATION RATE: 16 BRPM | BODY MASS INDEX: 38.16 KG/M2 | DIASTOLIC BLOOD PRESSURE: 76 MMHG | HEART RATE: 88 BPM | OXYGEN SATURATION: 98 % | WEIGHT: 215.4 LBS | TEMPERATURE: 97.3 F | HEIGHT: 63 IN | SYSTOLIC BLOOD PRESSURE: 120 MMHG

## 2023-03-15 DIAGNOSIS — I10 HYPERTENSION, UNSPECIFIED TYPE: Primary | ICD-10-CM

## 2023-03-15 DIAGNOSIS — E78.5 HYPERLIPIDEMIA, UNSPECIFIED HYPERLIPIDEMIA TYPE: ICD-10-CM

## 2023-03-15 DIAGNOSIS — Z00.00 MEDICARE ANNUAL WELLNESS VISIT, SUBSEQUENT: ICD-10-CM

## 2023-03-15 DIAGNOSIS — E66.01 SEVERE OBESITY (BMI 35.0-39.9) WITH COMORBIDITY (HCC): ICD-10-CM

## 2023-03-15 DIAGNOSIS — E11.9 TYPE 2 DIABETES MELLITUS WITHOUT COMPLICATION, WITHOUT LONG-TERM CURRENT USE OF INSULIN (HCC): ICD-10-CM

## 2023-03-15 DIAGNOSIS — K50.10 CROHN'S DISEASE OF COLON WITHOUT COMPLICATION (HCC): ICD-10-CM

## 2023-03-15 PROCEDURE — 3017F COLORECTAL CA SCREEN DOC REV: CPT | Performed by: INTERNAL MEDICINE

## 2023-03-15 PROCEDURE — G8417 CALC BMI ABV UP PARAM F/U: HCPCS | Performed by: INTERNAL MEDICINE

## 2023-03-15 PROCEDURE — G8511 SCR DEP POS, NO PLAN DOC RNG: HCPCS | Performed by: INTERNAL MEDICINE

## 2023-03-15 PROCEDURE — G8536 NO DOC ELDER MAL SCRN: HCPCS | Performed by: INTERNAL MEDICINE

## 2023-03-15 PROCEDURE — G8399 PT W/DXA RESULTS DOCUMENT: HCPCS | Performed by: INTERNAL MEDICINE

## 2023-03-15 PROCEDURE — 3046F HEMOGLOBIN A1C LEVEL >9.0%: CPT | Performed by: INTERNAL MEDICINE

## 2023-03-15 PROCEDURE — 99213 OFFICE O/P EST LOW 20 MIN: CPT | Performed by: INTERNAL MEDICINE

## 2023-03-15 PROCEDURE — G8427 DOCREV CUR MEDS BY ELIG CLIN: HCPCS | Performed by: INTERNAL MEDICINE

## 2023-03-15 PROCEDURE — G9899 SCRN MAM PERF RSLTS DOC: HCPCS | Performed by: INTERNAL MEDICINE

## 2023-03-15 PROCEDURE — 1101F PT FALLS ASSESS-DOCD LE1/YR: CPT | Performed by: INTERNAL MEDICINE

## 2023-03-15 PROCEDURE — 1090F PRES/ABSN URINE INCON ASSESS: CPT | Performed by: INTERNAL MEDICINE

## 2023-03-15 PROCEDURE — 2022F DILAT RTA XM EVC RTNOPTHY: CPT | Performed by: INTERNAL MEDICINE

## 2023-03-15 PROCEDURE — G0439 PPPS, SUBSEQ VISIT: HCPCS | Performed by: INTERNAL MEDICINE

## 2023-03-15 NOTE — PATIENT INSTRUCTIONS
Medicare Wellness Visit, Female     The best way to live healthy is to have a lifestyle where you eat a well-balanced diet, exercise regularly, limit alcohol use, and quit all forms of tobacco/nicotine, if applicable. Regular preventive services are another way to keep healthy. Preventive services (vaccines, screening tests, monitoring & exams) can help personalize your care plan, which helps you manage your own care. Screening tests can find health problems at the earliest stages, when they are easiest to treat. [facility] follows the current, evidence-based guidelines published by the [facility] ([facility]) when recommending preventive services for our patients. Because we follow these guidelines, sometimes recommendations change over time as research supports it. (For example, mammograms used to be recommended annually. Even though Medicare will still pay for an annual mammogram, the newer guidelines recommend a mammogram every two years for women of average risk). Of course, you and your doctor may decide to screen more often for some diseases, based on your risk and your co-morbidities (chronic disease you are already diagnosed with). Preventive services for you include:  - Medicare offers their members a free annual wellness visit, which is time for you and your primary care provider to discuss and plan for your preventive service needs.  Take advantage of this benefit every year!    -Over the age of 72 should receive the recommended pneumonia vaccines.    -All adults should have a flu vaccine yearly.  -All adults should have a tetanus vaccine every 10 years.   -Over the age 48 should receive the shingles vaccines.        -All adults should be screened once for Hepatitis C.  -All adults age 38-68 who are overweight should have a diabetes screening test once every three years.   -Other screening tests and preventive services for persons with diabetes include: an eye exam to screen for diabetic retinopathy, a kidney function test, a foot exam, and stricter control over your cholesterol.   -Cardiovascular screening for adults with routine risk involves an electrocardiogram (ECG) at intervals determined by your doctor.     -Colorectal cancer screenings should be done for adults age 39-70 with no increased risk factors for colorectal cancer. There are a number of acceptable methods of screening for this type of cancer. Each test has its own benefits and drawbacks. Discuss with your doctor what is most appropriate for you during your annual wellness visit. The different tests include: colonoscopy (considered the best screening method), a fecal occult blood test, a fecal DNA test, and sigmoidoscopy.    -Lung cancer screening is recommended annually with a low dose CT scan for adults between age 54 and 68, who have smoked at least 30 pack years (equivalent of 1 pack per day for 30 days), and who is a current smoker or quit less than 15 years ago.    -A bone mass density test is recommended when a woman turns 65 to screen for osteoporosis. This test is only recommended one time, as a screening. Some providers will use this same test as a disease monitoring tool if you already have osteoporosis. -Breast cancer screenings are recommended every other year for women of normal risk, age 54-69.    -Cervical cancer screenings for women over age 72 are only recommended with certain risk factors.      Here is a list of your current Health Maintenance items (your personalized list of preventive services) with a due date:  Health Maintenance Due   Topic Date Due    Eye Exam  08/22/2019    COVID-19 Vaccine (5 - Booster for Moderna series) 10/10/2022    URINE CHECK FOR KIDNEY PROBLEMS  02/03/2023    Depression Monitoring  02/03/2023    Cholesterol Test   02/03/2023    Hemoglobin A1C    02/16/2023

## 2023-03-15 NOTE — PROGRESS NOTES
1. Have you been to the ER, urgent care clinic since your last visit? Hospitalized since your last visit? No    2. Have you seen or consulted any other health care providers outside of the [address] [place] [address] since your last visit? Include any pap smears or colon screening.  No

## 2023-03-16 LAB
ALBUMIN SERPL-MCNC: 3.4 G/DL (ref 3.5–5)
ALBUMIN/GLOB SERPL: 0.9 (ref 1.1–2.2)
ALP SERPL-CCNC: 123 U/L (ref 45–117)
ALT SERPL-CCNC: 27 U/L (ref 12–78)
ANION GAP SERPL CALC-SCNC: 1 MMOL/L (ref 5–15)
AST SERPL-CCNC: 20 U/L (ref 15–37)
BILIRUB SERPL-MCNC: 0.4 MG/DL (ref 0.2–1)
BUN SERPL-MCNC: 12 MG/DL (ref 6–20)
BUN/CREAT SERPL: 15 (ref 12–20)
CALCIUM SERPL-MCNC: 9.2 MG/DL (ref 8.5–10.1)
CHLORIDE SERPL-SCNC: 106 MMOL/L (ref 97–108)
CHOLEST SERPL-MCNC: 208 MG/DL
CO2 SERPL-SCNC: 34 MMOL/L (ref 21–32)
CREAT SERPL-MCNC: 0.8 MG/DL (ref 0.55–1.02)
CREAT UR-MCNC: 206 MG/DL
ERYTHROCYTE [DISTWIDTH] IN BLOOD BY AUTOMATED COUNT: 13.9 % (ref 11.5–14.5)
EST. AVERAGE GLUCOSE BLD GHB EST-MCNC: 166 MG/DL
GLOBULIN SER CALC-MCNC: 4 G/DL (ref 2–4)
GLUCOSE SERPL-MCNC: 133 MG/DL (ref 65–100)
HBA1C MFR BLD: 7.4 % (ref 4–5.6)
HCT VFR BLD AUTO: 46 % (ref 35–47)
HDLC SERPL-MCNC: 60 MG/DL
HDLC SERPL: 3.5 (ref 0–5)
HGB BLD-MCNC: 13.7 G/DL (ref 11.5–16)
LDLC SERPL CALC-MCNC: 132.6 MG/DL (ref 0–100)
MCH RBC QN AUTO: 28.8 PG (ref 26–34)
MCHC RBC AUTO-ENTMCNC: 29.8 G/DL (ref 30–36.5)
MCV RBC AUTO: 96.8 FL (ref 80–99)
MICROALBUMIN UR-MCNC: 2.38 MG/DL
MICROALBUMIN/CREAT UR-RTO: 12 MG/G (ref 0–30)
NRBC # BLD: 0 K/UL (ref 0–0.01)
NRBC BLD-RTO: 0 PER 100 WBC
PLATELET # BLD AUTO: 204 K/UL (ref 150–400)
POTASSIUM SERPL-SCNC: 3.7 MMOL/L (ref 3.5–5.1)
PROT SERPL-MCNC: 7.4 G/DL (ref 6.4–8.2)
RBC # BLD AUTO: 4.75 M/UL (ref 3.8–5.2)
SODIUM SERPL-SCNC: 141 MMOL/L (ref 136–145)
TRIGL SERPL-MCNC: 77 MG/DL (ref ?–150)
VLDLC SERPL CALC-MCNC: 15.4 MG/DL
WBC # BLD AUTO: 6.3 K/UL (ref 3.6–11)

## 2023-03-16 NOTE — PROGRESS NOTES
Tell pt normal kidneys, liver lytes cbc urine protein  A1c slightly up --166 bs avg--continue metformin, work on low carb diet and exericse.   Also LDL chol above goal--stress adherence with lipitor 40 mg qd

## 2023-03-17 NOTE — PROGRESS NOTES
PT called at this time. 2 identifiers confirmed. Per Dr. Mohamud Kosovan:  normal kidneys, liver lytes cbc urine protein   A1c slightly up --166 bs avg--continue metformin, work on low carb diet and exericse. Also LDL chol above goal--stress adherence with lipitor 40 mg qd   Verbalized understanding.

## 2023-03-23 ENCOUNTER — HOSPITAL ENCOUNTER (EMERGENCY)
Age: 73
Discharge: HOME | End: 2023-03-23
Attending: EMERGENCY MEDICINE
Payer: MEDICARE

## 2023-03-23 ENCOUNTER — APPOINTMENT (OUTPATIENT)
Dept: GENERAL RADIOLOGY | Age: 73
End: 2023-03-23
Attending: EMERGENCY MEDICINE
Payer: MEDICARE

## 2023-03-23 ENCOUNTER — APPOINTMENT (OUTPATIENT)
Dept: CT IMAGING | Age: 73
End: 2023-03-23
Attending: EMERGENCY MEDICINE
Payer: MEDICARE

## 2023-03-23 VITALS
BODY MASS INDEX: 39.23 KG/M2 | HEIGHT: 62 IN | TEMPERATURE: 99 F | HEART RATE: 101 BPM | SYSTOLIC BLOOD PRESSURE: 133 MMHG | WEIGHT: 213.19 LBS | DIASTOLIC BLOOD PRESSURE: 69 MMHG | RESPIRATION RATE: 18 BRPM | OXYGEN SATURATION: 97 %

## 2023-03-23 DIAGNOSIS — K92.1 HEMATOCHEZIA: Primary | ICD-10-CM

## 2023-03-23 DIAGNOSIS — N85.2 ENLARGED UTERUS: ICD-10-CM

## 2023-03-23 DIAGNOSIS — K52.89 OTHER NONINFECTIOUS GASTROENTERITIS: ICD-10-CM

## 2023-03-23 DIAGNOSIS — R07.9 CHEST PAIN, UNSPECIFIED TYPE: ICD-10-CM

## 2023-03-23 LAB
ALBUMIN SERPL-MCNC: 3.1 G/DL (ref 3.5–5)
ALBUMIN/GLOB SERPL: 0.6 (ref 1.1–2.2)
ALP SERPL-CCNC: 119 U/L (ref 45–117)
ALT SERPL-CCNC: 29 U/L (ref 12–78)
ANION GAP SERPL CALC-SCNC: 5 MMOL/L (ref 5–15)
AST SERPL-CCNC: 34 U/L (ref 15–37)
BASOPHILS # BLD: 0 K/UL (ref 0–0.1)
BASOPHILS NFR BLD: 1 % (ref 0–1)
BILIRUB SERPL-MCNC: 0.6 MG/DL (ref 0.2–1)
BNP SERPL-MCNC: 14 PG/ML
BUN SERPL-MCNC: 14 MG/DL (ref 6–20)
BUN/CREAT SERPL: 15 (ref 12–20)
CALCIUM SERPL-MCNC: 9 MG/DL (ref 8.5–10.1)
CHLORIDE SERPL-SCNC: 106 MMOL/L (ref 97–108)
CO2 SERPL-SCNC: 26 MMOL/L (ref 21–32)
CREAT SERPL-MCNC: 0.94 MG/DL (ref 0.55–1.02)
DIFFERENTIAL METHOD BLD: ABNORMAL
EOSINOPHIL # BLD: 0.7 K/UL (ref 0–0.4)
EOSINOPHIL NFR BLD: 9 % (ref 0–7)
ERYTHROCYTE [DISTWIDTH] IN BLOOD BY AUTOMATED COUNT: 13.9 % (ref 11.5–14.5)
GLOBULIN SER CALC-MCNC: 5 G/DL (ref 2–4)
GLUCOSE SERPL-MCNC: 102 MG/DL (ref 65–100)
HCT VFR BLD AUTO: 44.1 % (ref 35–47)
HGB BLD-MCNC: 13.8 G/DL (ref 11.5–16)
IMM GRANULOCYTES # BLD AUTO: 0 K/UL (ref 0–0.04)
IMM GRANULOCYTES NFR BLD AUTO: 0 % (ref 0–0.5)
LYMPHOCYTES # BLD: 2.7 K/UL (ref 0.8–3.5)
LYMPHOCYTES NFR BLD: 33 % (ref 12–49)
MCH RBC QN AUTO: 29.6 PG (ref 26–34)
MCHC RBC AUTO-ENTMCNC: 31.3 G/DL (ref 30–36.5)
MCV RBC AUTO: 94.6 FL (ref 80–99)
MONOCYTES # BLD: 0.8 K/UL (ref 0–1)
MONOCYTES NFR BLD: 9 % (ref 5–13)
NEUTS SEG # BLD: 4 K/UL (ref 1.8–8)
NEUTS SEG NFR BLD: 48 % (ref 32–75)
NRBC # BLD: 0 K/UL (ref 0–0.01)
NRBC BLD-RTO: 0 PER 100 WBC
PLATELET # BLD AUTO: 267 K/UL (ref 150–400)
PMV BLD AUTO: 12.5 FL (ref 8.9–12.9)
POTASSIUM SERPL-SCNC: 3.9 MMOL/L (ref 3.5–5.1)
PROT SERPL-MCNC: 8.1 G/DL (ref 6.4–8.2)
RBC # BLD AUTO: 4.66 M/UL (ref 3.8–5.2)
SODIUM SERPL-SCNC: 137 MMOL/L (ref 136–145)
TROPONIN I SERPL HS-MCNC: 4 NG/L (ref 0–51)
WBC # BLD AUTO: 8.2 K/UL (ref 3.6–11)

## 2023-03-23 PROCEDURE — 99285 EMERGENCY DEPT VISIT HI MDM: CPT

## 2023-03-23 PROCEDURE — 85025 COMPLETE CBC W/AUTO DIFF WBC: CPT

## 2023-03-23 PROCEDURE — 93005 ELECTROCARDIOGRAM TRACING: CPT

## 2023-03-23 PROCEDURE — 84484 ASSAY OF TROPONIN QUANT: CPT

## 2023-03-23 PROCEDURE — 2709999900 HC NON-CHARGEABLE SUPPLY

## 2023-03-23 PROCEDURE — 83880 ASSAY OF NATRIURETIC PEPTIDE: CPT

## 2023-03-23 PROCEDURE — 36415 COLL VENOUS BLD VENIPUNCTURE: CPT

## 2023-03-23 PROCEDURE — 80053 COMPREHEN METABOLIC PANEL: CPT

## 2023-03-23 PROCEDURE — 74177 CT ABD & PELVIS W/CONTRAST: CPT

## 2023-03-23 PROCEDURE — 71046 X-RAY EXAM CHEST 2 VIEWS: CPT

## 2023-03-23 PROCEDURE — 74011000636 HC RX REV CODE- 636: Performed by: EMERGENCY MEDICINE

## 2023-03-23 RX ORDER — PREDNISONE 20 MG/1
30 TABLET ORAL DAILY
Qty: 21 TABLET | Refills: 0 | Status: SHIPPED | OUTPATIENT
Start: 2023-03-23 | End: 2023-04-06

## 2023-03-23 RX ADMIN — IOHEXOL 50 ML: 240 INJECTION, SOLUTION INTRATHECAL; INTRAVASCULAR; INTRAVENOUS; ORAL at 18:51

## 2023-03-23 RX ADMIN — IOMEPROL INJECTION 100 ML: 714 INJECTION, SOLUTION INTRAVASCULAR at 20:53

## 2023-03-23 NOTE — ED PROVIDER NOTES
[facility] EMERGENCY DEPT  EMERGENCY DEPARTMENT ENCOUNTER       Pt Name: Chancy Goltz  MRN: [id number]  [place] 1950  Date of evaluation: 3/23/2023  Provider: Aurora Montgomery MD   PCP: Patrice Cobos MD  Note Started: 6:17 PM 3/23/23     CHIEF COMPLAINT       Chief Complaint   Patient presents with    Rectal Bleeding     Intermittent x3 weeks, denies having to strain with BM - soft and watery stools. Hx of crohn's and ulcers    Chest Pain     Intermittent x1 week, sharp pain on left side, non radiating. Worse today         HISTORY OF PRESENT ILLNESS: 1 or more elements      History From: patient, History limited by: none     Chancy Goltz is a 67 y.o. female with a history of mild Crohns disease on most recent colonoscopy presents to the emergency department with a chief complaint of blood in stool. Patient reports has been ongoing for 3 week. Patient reports bright red blood in her stool that is mixed with mucus. She denies any significant abdominal pain. No hematemesis. She is not on blood thinners. She has not seen anyone for this. In addition, patient reports atypical intermittent left-sided chest pain. Denies any shortness of breath or cough. Please See [facility] for Additional Details of the HPI/PMH  Nursing Notes were all reviewed and agreed with or any disagreements were addressed in the HPI. REVIEW OF SYSTEMS        Positives and Pertinent negatives as per HPI.     PAST HISTORY     Past Medical History:  Past Medical History:   Diagnosis Date    Asthma     Crohn disease ([facility] [address]     Diabetes ([facility] [address]     Hypertension     Menopause        Past Surgical History:  Past Surgical History:   Procedure Laterality Date    COLONOSCOPY N/A 2/22/2022    COLONOSCOPY  needs rapid covid performed by Jennifer Franklin MD at [facility] ENDOSCOPY    COLONOSCOPY,DIAGNOSTIC  2/22/2022            Family History:  Family History   Problem Relation Age of Onset    Diabetes Mother     High Cholesterol Mother Hypertension Mother     Diabetes Sister     Hypertension Sister        Social History:  Social History     Tobacco Use    Smoking status: Former     Packs/day: 0.50     Years: 30.00     Pack years: 15.00     Types: Cigarettes     Quit date: 2011     Years since quittin.2    Smokeless tobacco: Never   Substance Use Topics    Alcohol use: No    Drug use: Yes     Types: Prescription, OTC       Allergies:  No Known Allergies    CURRENT MEDICATIONS      Previous Medications    ALBUTEROL (PROVENTIL HFA, VENTOLIN HFA, PROAIR HFA) 90 MCG/ACTUATION INHALER    USE 1 INHALATION BY MOUTH  EVERY 4 HOURS AS NEEDED FOR WHEEZING    ALBUTEROL (PROVENTIL VENTOLIN) 2.5 MG /3 ML (0.083 %) NEBU    USE 1 VIAL VIA NEBULIZER  EVERY 4 HOURS AS NEEDED FOR WHEEZING    ASPIRIN, BUFFERED 81 MG TAB    Take  by mouth. ATORVASTATIN (LIPITOR) 40 MG TABLET    TAKE 1 TABLET BY MOUTH  DAILY    CALCIUM-CHOLECALCIFEROL, D3, 600-125 MG-UNIT TAB    Take 1 Tab by mouth two (2) times a day. FA/MV,CA,FE,MIN/LYCOPENE/LUT (MULTIVITAL PO)    Take  by mouth daily. GLUCOSE BLOOD VI TEST STRIPS (TRUETRACK TEST) STRIP    1 Each by Does Not Apply route Daily (before breakfast). LISINOPRIL-HYDROCHLOROTHIAZIDE (PRINZIDE, ZESTORETIC) 20-25 MG PER TABLET    TAKE 1 TABLET BY MOUTH  DAILY    METFORMIN (GLUCOPHAGE) 500 MG TABLET    TAKE 2 TABLETs BY MOUTH TWICE DAILY WITH MEALS    TRIAMCINOLONE ACETONIDE (KENALOG) 0.1 % TOPICAL CREAM    Apply  to affected area two (2) times a day. use thin layer       SCREENINGS               No data recorded         PHYSICAL EXAM      ED Triage Vitals [23 1718]   ED Encounter Vitals Group      /69      Pulse (Heart Rate) (!) 101      Resp Rate 18      Temp 99 °F (37.2 °C)      Temp src       O2 Sat (%) 97 %      Weight 213 lb 3 oz      Height 5' 2\"        Physical Exam  Vitals and nursing note reviewed.    Constitutional:       Comments: 59-year-old female, resting in bed, no distress   HENT:      Head: Normocephalic and atraumatic. Cardiovascular:      Rate and Rhythm: Normal rate and regular rhythm. Pulses:           Radial pulses are 2+ on the right side. Heart sounds: Normal heart sounds. Pulmonary:      Effort: Pulmonary effort is normal.      Breath sounds: Normal breath sounds. Abdominal:      General: Abdomen is flat. Palpations: Abdomen is soft. Tenderness: There is no abdominal tenderness. Musculoskeletal:         General: Normal range of motion. Right lower leg: No edema. Left lower leg: No edema. Skin:     General: Skin is warm. Neurological:      General: No focal deficit present. Mental Status: She is alert. Psychiatric:         Mood and Affect: Mood normal.        DIAGNOSTIC RESULTS   LABS:     Recent Results (from the past 12 hour(s))   EKG, 12 LEAD, INITIAL    Collection Time: 03/23/23  5:23 PM   Result Value Ref Range    Ventricular Rate 100 BPM    Atrial Rate 100 BPM    P-R Interval 152 ms    QRS Duration 74 ms    Q-T Interval 348 ms    QTC Calculation (Bezet) 448 ms    Calculated P Axis 80 degrees    Calculated R Axis 21 degrees    Calculated T Axis 67 degrees    Diagnosis       Normal sinus rhythm  Septal infarct , age undetermined  When compared with ECG of 23-JAN-2018 16:26,  No significant change was found     CBC WITH AUTOMATED DIFF    Collection Time: 03/23/23  5:29 PM   Result Value Ref Range    WBC 8.2 3.6 - 11.0 K/uL    RBC 4.66 3.80 - 5.20 M/uL    HGB 13.8 11.5 - 16.0 g/dL    HCT 44.1 35.0 - 47.0 %    MCV 94.6 80.0 - 99.0 FL    MCH 29.6 26.0 - 34.0 PG    MCHC 31.3 30.0 - 36.5 g/dL    RDW 13.9 11.5 - 14.5 %    PLATELET 957 182 - 220 K/uL    MPV 12.5 8.9 - 12.9 FL    NRBC 0.0 0  WBC    ABSOLUTE NRBC 0.00 0.00 - 0.01 K/uL    NEUTROPHILS 48 32 - 75 %    LYMPHOCYTES 33 12 - 49 %    MONOCYTES 9 5 - 13 %    EOSINOPHILS 9 (H) 0 - 7 %    BASOPHILS 1 0 - 1 %    IMMATURE GRANULOCYTES 0 0.0 - 0.5 %    ABS.  NEUTROPHILS 4.0 1.8 - 8.0 K/UL ABS. LYMPHOCYTES 2.7 0.8 - 3.5 K/UL    ABS. MONOCYTES 0.8 0.0 - 1.0 K/UL    ABS. EOSINOPHILS 0.7 (H) 0.0 - 0.4 K/UL    ABS. BASOPHILS 0.0 0.0 - 0.1 K/UL    ABS. IMM. GRANS. 0.0 0.00 - 0.04 K/UL    DF AUTOMATED     METABOLIC PANEL, COMPREHENSIVE    Collection Time: 03/23/23  5:29 PM   Result Value Ref Range    Sodium 137 136 - 145 mmol/L    Potassium 3.9 3.5 - 5.1 mmol/L    Chloride 106 97 - 108 mmol/L    CO2 26 21 - 32 mmol/L    Anion gap 5 5 - 15 mmol/L    Glucose 102 (H) 65 - 100 mg/dL    BUN 14 6 - 20 MG/DL    Creatinine 0.94 0.55 - 1.02 MG/DL    BUN/Creatinine ratio 15 12 - 20      eGFR >60 >60 ml/min/1.73m2    Calcium 9.0 8.5 - 10.1 MG/DL    Bilirubin, total 0.6 0.2 - 1.0 MG/DL    ALT (SGPT) 29 12 - 78 U/L    AST (SGOT) 34 15 - 37 U/L    Alk. phosphatase 119 (H) 45 - 117 U/L    Protein, total 8.1 6.4 - 8.2 g/dL    Albumin 3.1 (L) 3.5 - 5.0 g/dL    Globulin 5.0 (H) 2.0 - 4.0 g/dL    A-G Ratio 0.6 (L) 1.1 - 2.2     NT-PRO BNP    Collection Time: 03/23/23  5:29 PM   Result Value Ref Range    NT pro-BNP 14 <125 PG/ML   TROPONIN-HIGH SENSITIVITY    Collection Time: 03/23/23  5:29 PM   Result Value Ref Range    Troponin-High Sensitivity 4 0 - 51 ng/L        EKG: If performed, independent interpretation documented below in the MDM section     RADIOLOGY:  Non-plain film images such as CT, Ultrasound and MRI are read by the radiologist. Plain radiographic images are visualized and preliminarily interpreted by the ED Provider with the findings documented in the MDM section. Interpretation per the Radiologist below, if available at the time of this note:     XR CHEST PA LAT    Result Date: 3/23/2023  EXAM: XR CHEST PA LAT INDICATION: Chest pain COMPARISON: January 2018 TECHNIQUE: PA and lateral chest views FINDINGS: The cardiac size is within normal limits. The pulmonary vasculature is within normal limits. The lungs and pleural spaces are clear. The visualized bones and upper abdomen are age-appropriate.      Normal PA and lateral chest views. CT ABD PELV W CONT    Result Date: 3/23/2023  INDICATION: Crohn's disease. CT of the abdomen and pelvis is performed with 5 mm collimation. Study is performed with PO and 100 cc of nonionic Isovue 370. Sagittal and coronal reformatted images were also performed. CT dose reduction was achieved with the use of the standardized protocol tailored for this examination and automatic exposure control for dose modulation. There is no prior study for direct comparison. Findings: Lung bases: The visualized lung bases are clear. Liver: There is diffuse fatty infiltration of the liver. Adrenals: Adrenal glands are normal. Pancreas: The pancreas is normal. Gallbladder: The gallbladder is normal. Kidneys: The kidneys are normal. There is no hydronephrosis. Spleen: The spleen is normal. Lymph nodes. There is no lacey hepatitis, mesenteric, retroperitoneal or pelvic lymphadenopathy. Bowel: There is a 3.4 cm x 2 cm lipoma in the proximal transverse colon. There appears to be circumferential thickening of the distal sigmoid colon and rectum. No additional thickened loop of bowel is seen. No dilated loop of large or small bowel is visualized. There is no evidence of fistula. There is no focal fluid collection to suggest abscess. Appendix: The appendix is normal. Urinary bladder: Urinary bladder is partially filled and grossly normal. Gyn: There is a 8 cm x 10.4 cm x 9 cm well-circumscribed inhomogeneous mass in the uterus, which contains hypo and hyperdense soft tissue components as well as fat. Miscellaneous: There is no free intraperitoneal fluid or gas. There is no focal fluid collection to suggest abscess. 1. Circumferential thickening of the distal sigmoid colon and rectum. 2. Diffuse hepatic steatosis. 3. 3.4 cm x 2 cm lipoma in the proximal transverse colon. 4. 8 cm x 10.4 cm x 9 cm inhomogeneous mass in the uterus, which contains hypo and hyperdense soft tissue components as well as fat. Finding likely represents a lipoma lipoma. PROCEDURES   Unless otherwise noted below, none  Procedures     CRITICAL CARE TIME   0    EMERGENCY DEPARTMENT COURSE and DIFFERENTIAL DIAGNOSIS/MDM   Vitals:    Vitals:    03/23/23 1718   BP: 133/69   Pulse: (!) 101   Resp: 18   Temp: 99 °F (37.2 °C)   SpO2: 97%   Weight: 96.7 kg (213 lb 3 oz)   Height: 5' 2\" (1.575 m)        Patient was given the following medications:  Medications   iohexoL (OMNIPAQUE) 240 mg iodine/mL solution 50 mL (50 mL Oral Given 3/23/23 1851)   iomeprol (IOMERON 350) 350 mg iodine /mL (71.44 %) contrast injection 100 mL (100 mL IntraVENous Given 3/23/23 2053)       Medical Decision Making  80-year-old female presents emerged part with chief complaint of hematochezia x3 weeks. Appears quite well. Vitals unremarkable. Does have a charted history of Crohn's and previous colonoscopy. Not on blood thinners. Will check hemoglobin, differential for patient's hematochezia includes Crohn's flare, colitis, diverticular bleed, less likely hemorrhoidal.  Will check CT with oral contrast.  We will check basic labs. Patient does report 1 week of intermittent chest pain. Appears atypical for ACS. Twelve-lead EKG is unremarkable. Will check troponin. Will check chest x-ray. My suspicion for PE or dissection or other acute thoracic pathology is low. Amount and/or Complexity of Data Reviewed  External Data Reviewed: notes. Details: colonoscopy report  Labs: ordered. Decision-making details documented in ED Course. Radiology: ordered. Decision-making details documented in ED Course. ECG/medicine tests: ordered and independent interpretation performed. Decision-making details documented in ED Course. Risk  Prescription drug management. ED Course as of 03/23/23 2148   Thu Mar 23, 2023   1822 EKG interpreted by me. Shows ST with a HR of 100. No ST elevations or depressions concerning for ischemia. Normal intervals.      [MB] Celeste 86 with Dr. Bill Orellana, gastroenterology, consider if CT if otherwise negative, could consider initiation of steroids. Recommend close follow-up with Dr. Weston Sanches. [MB]   2047 Labs are unremarkable. Patient troponin is negative. [MB]   2145 Reassessed, resting in bed, no additional bleeding. Informed of incidental uterine enlargement. Patient's lipoma seen on previous colonoscopy report. Informed to start prednisone taper, follow-up with GI. Discussed PCP follow-up. Troponin negative, unclear chest pain. Discussed return precautions, patient and  agreeable to plan. [MB]      ED Course User Index  [MB] Lou Plascencia MD         FINAL IMPRESSION     1. Hematochezia    2. Other noninfectious gastroenteritis    3. Enlarged uterus    4. Chest pain, unspecified type          DISPOSITION/PLAN   Adriane Schmidt's  results have been reviewed with her. She has been counseled regarding her diagnosis, treatment, and plan. She verbally conveys understanding and agreement of the signs, symptoms, diagnosis, treatment and prognosis and additionally agrees to follow up as discussed. She also agrees with the care-plan and conveys that all of her questions have been answered. I have also provided discharge instructions for her that include: educational information regarding their diagnosis and treatment, and list of reasons why they would want to return to the ED prior to their follow-up appointment, should her condition change.      CLINICAL IMPRESSION    Discharged     PATIENT REFERRED TO:  Follow-up Information       Follow up With Specialties Details Why Contact Info    Steph Mcduffie MD Internal Medicine Physician In 3 days  [facility]  [phone]      [facility] EMERGENCY DEPT Emergency Medicine  If symptoms worsen [address] [place] [address] 31    Enio Ewing MD Gastroenterology In 3 days  [facility] [address]  In 2 weeks  [place] [address] [id number]              DISCHARGE MEDICATIONS:  Current Discharge Medication List        START taking these medications    Details   predniSONE (DELTASONE) 20 mg tablet Take 1.5 Tablets by mouth daily for 14 days. With Breakfast  Qty: 21 Tablet, Refills: 0  Start date: 3/23/2023, End date: 4/6/2023               DISCONTINUED MEDICATIONS:  Current Discharge Medication List          I am the Primary Clinician of Record. Dangelo Solorzano MD (electronically signed)    (Please note that parts of this dictation were completed with voice recognition software. Quite often unanticipated grammatical, syntax, homophones, and other interpretive errors are inadvertently transcribed by the computer software. Please disregards these errors.  Please excuse any errors that have escaped final proofreading.)

## 2023-03-23 NOTE — ED NOTES
Pt arrives to ED with c/o watery bloody stool x weeks along with left sided chest pain x weeks. Pt denies sob. She does have a hx of Crohns and hemorrhoids. She denies any alcohol use and is not on any blood thinners. Dr. Patric Breaux at bedside to assess.

## 2023-03-24 NOTE — DISCHARGE INSTRUCTIONS
You were seen in the ER for your symptoms. Please follow-up with your GI doctor. Your CAT scan showed some colitis, please use the steroids. It will be important to have the rest of these prescribed by your GI doctor. You EKG and cardiac enzymes were normal.    Please follow-up with the GYN doctor for an enlarged uterus seen on CAT scan.     Return for new or worsening symptoms at any time

## 2023-03-25 LAB
ATRIAL RATE: 100 BPM
CALCULATED P AXIS, ECG09: 80 DEGREES
CALCULATED R AXIS, ECG10: 21 DEGREES
CALCULATED T AXIS, ECG11: 67 DEGREES
DIAGNOSIS, 93000: NORMAL
P-R INTERVAL, ECG05: 152 MS
Q-T INTERVAL, ECG07: 348 MS
QRS DURATION, ECG06: 74 MS
QTC CALCULATION (BEZET), ECG08: 448 MS
VENTRICULAR RATE, ECG03: 100 BPM

## 2023-03-27 ENCOUNTER — TELEPHONE (OUTPATIENT)
Dept: INTERNAL MEDICINE CLINIC | Age: 73
End: 2023-03-27

## 2023-08-18 ENCOUNTER — TRANSCRIBE ORDERS (OUTPATIENT)
Facility: HOSPITAL | Age: 73
End: 2023-08-18

## 2023-08-18 DIAGNOSIS — Z12.31 ENCOUNTER FOR SCREENING MAMMOGRAM FOR MALIGNANT NEOPLASM OF BREAST: Primary | ICD-10-CM

## 2023-09-13 ENCOUNTER — HOSPITAL ENCOUNTER (OUTPATIENT)
Facility: HOSPITAL | Age: 73
Discharge: HOME | End: 2023-09-16
Attending: INTERNAL MEDICINE
Payer: MEDICARE

## 2023-09-13 VITALS — BODY MASS INDEX: 39.2 KG/M2 | HEIGHT: 62 IN | WEIGHT: 213 LBS

## 2023-09-13 DIAGNOSIS — Z12.31 ENCOUNTER FOR SCREENING MAMMOGRAM FOR MALIGNANT NEOPLASM OF BREAST: ICD-10-CM

## 2023-09-13 PROCEDURE — 77063 BREAST TOMOSYNTHESIS BI: CPT
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
## 2023-09-19 ENCOUNTER — OFFICE VISIT (OUTPATIENT)
Age: 73
End: 2023-09-19
Payer: MEDICARE

## 2023-09-19 VITALS
WEIGHT: 201.8 LBS | SYSTOLIC BLOOD PRESSURE: 104 MMHG | DIASTOLIC BLOOD PRESSURE: 72 MMHG | TEMPERATURE: 97.1 F | BODY MASS INDEX: 37.13 KG/M2 | HEART RATE: 85 BPM | OXYGEN SATURATION: 98 % | HEIGHT: 62 IN | RESPIRATION RATE: 16 BRPM

## 2023-09-19 DIAGNOSIS — Z23 ENCOUNTER FOR IMMUNIZATION: ICD-10-CM

## 2023-09-19 DIAGNOSIS — I10 HYPERTENSION, UNSPECIFIED TYPE: ICD-10-CM

## 2023-09-19 DIAGNOSIS — E78.00 PURE HYPERCHOLESTEROLEMIA: ICD-10-CM

## 2023-09-19 DIAGNOSIS — E11.9 TYPE 2 DIABETES MELLITUS WITHOUT COMPLICATION, WITHOUT LONG-TERM CURRENT USE OF INSULIN (HCC): Primary | ICD-10-CM

## 2023-09-19 DIAGNOSIS — Z91.81 AT HIGH RISK FOR FALLS: ICD-10-CM

## 2023-09-19 LAB — HBA1C MFR BLD: 7.9 %

## 2023-09-19 PROCEDURE — G0008 ADMIN INFLUENZA VIRUS VAC: HCPCS | Performed by: INTERNAL MEDICINE

## 2023-09-19 PROCEDURE — 3051F HG A1C>EQUAL 7.0%<8.0%: CPT | Performed by: INTERNAL MEDICINE

## 2023-09-19 PROCEDURE — 3074F SYST BP LT 130 MM HG: CPT | Performed by: INTERNAL MEDICINE

## 2023-09-19 PROCEDURE — 90694 VACC AIIV4 NO PRSRV 0.5ML IM: CPT | Performed by: INTERNAL MEDICINE

## 2023-09-19 PROCEDURE — 99214 OFFICE O/P EST MOD 30 MIN: CPT | Performed by: INTERNAL MEDICINE

## 2023-09-19 PROCEDURE — 83036 HEMOGLOBIN GLYCOSYLATED A1C: CPT | Performed by: INTERNAL MEDICINE

## 2023-09-19 PROCEDURE — PBSHW INFLUENZA, FLUAD, (AGE 65 Y+), IM, PF, 0.5 ML: Performed by: INTERNAL MEDICINE

## 2023-09-19 PROCEDURE — 3078F DIAST BP <80 MM HG: CPT | Performed by: INTERNAL MEDICINE

## 2023-09-19 PROCEDURE — PBSHW AMB POC HEMOGLOBIN A1C: Performed by: INTERNAL MEDICINE

## 2023-09-19 PROCEDURE — 1123F ACP DISCUSS/DSCN MKR DOCD: CPT | Performed by: INTERNAL MEDICINE

## 2023-09-19 RX ORDER — GLIMEPIRIDE 1 MG/1
1 TABLET ORAL EVERY MORNING
Qty: 90 TABLET | Refills: 3 | Status: SHIPPED | OUTPATIENT
Start: 2023-09-19

## 2023-09-19 RX ORDER — ATORVASTATIN CALCIUM 20 MG/1
20 TABLET, FILM COATED ORAL DAILY
Qty: 90 TABLET | Refills: 3 | Status: SHIPPED | OUTPATIENT
Start: 2023-09-19

## 2023-09-19 SDOH — ECONOMIC STABILITY: INCOME INSECURITY: HOW HARD IS IT FOR YOU TO PAY FOR THE VERY BASICS LIKE FOOD, HOUSING, MEDICAL CARE, AND HEATING?: NOT HARD AT ALL

## 2023-09-19 SDOH — ECONOMIC STABILITY: HOUSING INSECURITY
IN THE LAST 12 MONTHS, WAS THERE A TIME WHEN YOU DID NOT HAVE A STEADY PLACE TO SLEEP OR SLEPT IN A SHELTER (INCLUDING NOW)?: NO

## 2023-09-19 SDOH — ECONOMIC STABILITY: FOOD INSECURITY: WITHIN THE PAST 12 MONTHS, YOU WORRIED THAT YOUR FOOD WOULD RUN OUT BEFORE YOU GOT MONEY TO BUY MORE.: NEVER TRUE

## 2023-09-19 SDOH — ECONOMIC STABILITY: FOOD INSECURITY: WITHIN THE PAST 12 MONTHS, THE FOOD YOU BOUGHT JUST DIDN'T LAST AND YOU DIDN'T HAVE MONEY TO GET MORE.: NEVER TRUE

## 2023-09-19 NOTE — PROGRESS NOTES
1. \"Have you been to the ER, urgent care clinic since your last visit? Hospitalized since your last visit? \"         ER visit 3/2023 // hematochezia     2. \"Have you seen or consulted any other health care providers outside of the [address] [place] [address] since your last visit? \" No     3. For patients aged 43-73: Has the patient had a colonoscopy / FIT/ Cologuard? 2022 teresa       If the patient is female:    4. For patients aged 43-66: Has the patient had a mammogram within the past 2 years? 2023      5. For patients aged 21-65: Has the patient had a pap smear?   No

## 2024-01-26 NOTE — ED NOTES
Bedside and Verbal shift change report given to Maria Alejandra Gómez (oncoming nurse) by [address]  (offgoing nurse). Report included the following information SBAR, ED Summary and Recent Results. [FreeTextEntry1] : Pt descended to 2.0 CATA @ 1.5 PSI/min without incident in chamber #4 Pt resting @ depth with chest rise and fall observed throughout tx. Pt ascended from 2.0 CATA @ 1.5 PSI/min without incident. Pt tolerated tx well.

## 2024-03-25 RX ORDER — ALBUTEROL SULFATE 90 UG/1
AEROSOL, METERED RESPIRATORY (INHALATION)
Qty: 25.5 G | Refills: 2 | Status: SHIPPED | OUTPATIENT
Start: 2024-03-25

## 2024-05-20 RX ORDER — ATORVASTATIN CALCIUM 20 MG/1
20 TABLET, FILM COATED ORAL DAILY
Qty: 100 TABLET | Refills: 2 | Status: SHIPPED | OUTPATIENT
Start: 2024-05-20

## 2024-06-11 ENCOUNTER — OFFICE VISIT (OUTPATIENT)
Age: 74
End: 2024-06-11
Payer: MEDICARE

## 2024-06-11 VITALS
HEART RATE: 73 BPM | TEMPERATURE: 97 F | WEIGHT: 209 LBS | SYSTOLIC BLOOD PRESSURE: 136 MMHG | BODY MASS INDEX: 39.46 KG/M2 | OXYGEN SATURATION: 97 % | DIASTOLIC BLOOD PRESSURE: 74 MMHG | HEIGHT: 61 IN | RESPIRATION RATE: 16 BRPM

## 2024-06-11 DIAGNOSIS — I10 HYPERTENSION, UNSPECIFIED TYPE: ICD-10-CM

## 2024-06-11 DIAGNOSIS — Z00.00 MEDICARE ANNUAL WELLNESS VISIT, SUBSEQUENT: ICD-10-CM

## 2024-06-11 DIAGNOSIS — K50.10 CROHN'S DISEASE OF LARGE INTESTINE WITHOUT COMPLICATIONS (HCC): ICD-10-CM

## 2024-06-11 DIAGNOSIS — E78.00 PURE HYPERCHOLESTEROLEMIA: Primary | ICD-10-CM

## 2024-06-11 DIAGNOSIS — E11.9 TYPE 2 DIABETES MELLITUS WITHOUT COMPLICATION, WITHOUT LONG-TERM CURRENT USE OF INSULIN (HCC): ICD-10-CM

## 2024-06-11 DIAGNOSIS — J44.9 CHRONIC OBSTRUCTIVE PULMONARY DISEASE, UNSPECIFIED COPD TYPE (HCC): ICD-10-CM

## 2024-06-11 LAB
ALBUMIN SERPL-MCNC: 3.4 G/DL (ref 3.5–5)
ALBUMIN/GLOB SERPL: 0.8 (ref 1.1–2.2)
ALP SERPL-CCNC: 131 U/L (ref 45–117)
ALT SERPL-CCNC: 19 U/L (ref 12–78)
ANION GAP SERPL CALC-SCNC: 3 MMOL/L (ref 5–15)
AST SERPL-CCNC: 16 U/L (ref 15–37)
BILIRUB SERPL-MCNC: 0.6 MG/DL (ref 0.2–1)
BUN SERPL-MCNC: 14 MG/DL (ref 6–20)
BUN/CREAT SERPL: 17 (ref 12–20)
CALCIUM SERPL-MCNC: 9.6 MG/DL (ref 8.5–10.1)
CHLORIDE SERPL-SCNC: 106 MMOL/L (ref 97–108)
CHOLEST SERPL-MCNC: 188 MG/DL
CO2 SERPL-SCNC: 33 MMOL/L (ref 21–32)
CREAT SERPL-MCNC: 0.83 MG/DL (ref 0.55–1.02)
CREAT UR-MCNC: 147 MG/DL
ERYTHROCYTE [DISTWIDTH] IN BLOOD BY AUTOMATED COUNT: 13.7 % (ref 11.5–14.5)
GLOBULIN SER CALC-MCNC: 4.2 G/DL (ref 2–4)
GLUCOSE SERPL-MCNC: 121 MG/DL (ref 65–100)
HBA1C MFR BLD: 7.2 %
HCT VFR BLD AUTO: 43.6 % (ref 35–47)
HDLC SERPL-MCNC: 64 MG/DL
HDLC SERPL: 2.9 (ref 0–5)
HGB BLD-MCNC: 13.8 G/DL (ref 11.5–16)
LDLC SERPL CALC-MCNC: 107.8 MG/DL (ref 0–100)
MCH RBC QN AUTO: 29.4 PG (ref 26–34)
MCHC RBC AUTO-ENTMCNC: 31.7 G/DL (ref 30–36.5)
MCV RBC AUTO: 93 FL (ref 80–99)
MICROALBUMIN UR-MCNC: 2.23 MG/DL
MICROALBUMIN/CREAT UR-RTO: 15 MG/G (ref 0–30)
NRBC # BLD: 0 K/UL (ref 0–0.01)
NRBC BLD-RTO: 0 PER 100 WBC
PLATELET # BLD AUTO: 187 K/UL (ref 150–400)
POTASSIUM SERPL-SCNC: 4 MMOL/L (ref 3.5–5.1)
PROT SERPL-MCNC: 7.6 G/DL (ref 6.4–8.2)
RBC # BLD AUTO: 4.69 M/UL (ref 3.8–5.2)
SODIUM SERPL-SCNC: 142 MMOL/L (ref 136–145)
TRIGL SERPL-MCNC: 81 MG/DL
TSH SERPL DL<=0.05 MIU/L-ACNC: 1.85 UIU/ML (ref 0.36–3.74)
VLDLC SERPL CALC-MCNC: 16.2 MG/DL
WBC # BLD AUTO: 5.6 K/UL (ref 3.6–11)

## 2024-06-11 PROCEDURE — 99214 OFFICE O/P EST MOD 30 MIN: CPT | Performed by: INTERNAL MEDICINE

## 2024-06-11 PROCEDURE — 83036 HEMOGLOBIN GLYCOSYLATED A1C: CPT | Performed by: INTERNAL MEDICINE

## 2024-06-11 PROCEDURE — 1123F ACP DISCUSS/DSCN MKR DOCD: CPT | Performed by: INTERNAL MEDICINE

## 2024-06-11 PROCEDURE — PBSHW AMB POC HEMOGLOBIN A1C: Performed by: INTERNAL MEDICINE

## 2024-06-11 PROCEDURE — 3075F SYST BP GE 130 - 139MM HG: CPT | Performed by: INTERNAL MEDICINE

## 2024-06-11 PROCEDURE — 3078F DIAST BP <80 MM HG: CPT | Performed by: INTERNAL MEDICINE

## 2024-06-11 PROCEDURE — G0439 PPPS, SUBSEQ VISIT: HCPCS | Performed by: INTERNAL MEDICINE

## 2024-06-11 RX ORDER — GLIMEPIRIDE 1 MG/1
1 TABLET ORAL EVERY MORNING
Qty: 90 TABLET | Refills: 3 | Status: SHIPPED | OUTPATIENT
Start: 2024-06-11

## 2024-06-11 ASSESSMENT — PATIENT HEALTH QUESTIONNAIRE - PHQ9
9. THOUGHTS THAT YOU WOULD BE BETTER OFF DEAD, OR OF HURTING YOURSELF: NOT AT ALL
SUM OF ALL RESPONSES TO PHQ QUESTIONS 1-9: 0
SUM OF ALL RESPONSES TO PHQ QUESTIONS 1-9: 0
SUM OF ALL RESPONSES TO PHQ9 QUESTIONS 1 & 2: 0
10. IF YOU CHECKED OFF ANY PROBLEMS, HOW DIFFICULT HAVE THESE PROBLEMS MADE IT FOR YOU TO DO YOUR WORK, TAKE CARE OF THINGS AT HOME, OR GET ALONG WITH OTHER PEOPLE: NOT DIFFICULT AT ALL
SUM OF ALL RESPONSES TO PHQ QUESTIONS 1-9: 0
8. MOVING OR SPEAKING SO SLOWLY THAT OTHER PEOPLE COULD HAVE NOTICED. OR THE OPPOSITE, BEING SO FIGETY OR RESTLESS THAT YOU HAVE BEEN MOVING AROUND A LOT MORE THAN USUAL: NOT AT ALL
4. FEELING TIRED OR HAVING LITTLE ENERGY: NOT AT ALL
6. FEELING BAD ABOUT YOURSELF - OR THAT YOU ARE A FAILURE OR HAVE LET YOURSELF OR YOUR FAMILY DOWN: NOT AT ALL
5. POOR APPETITE OR OVEREATING: NOT AT ALL
7. TROUBLE CONCENTRATING ON THINGS, SUCH AS READING THE NEWSPAPER OR WATCHING TELEVISION: NOT AT ALL
3. TROUBLE FALLING OR STAYING ASLEEP: NOT AT ALL
2. FEELING DOWN, DEPRESSED OR HOPELESS: NOT AT ALL
1. LITTLE INTEREST OR PLEASURE IN DOING THINGS: NOT AT ALL
SUM OF ALL RESPONSES TO PHQ QUESTIONS 1-9: 0

## 2024-06-11 ASSESSMENT — LIFESTYLE VARIABLES: HOW OFTEN DO YOU HAVE A DRINK CONTAINING ALCOHOL: NEVER

## 2024-06-11 NOTE — PATIENT INSTRUCTIONS
call for help?   Call 911 if you have symptoms of a heart attack. These may include:    Chest pain or pressure, or a strange feeling in the chest.     Sweating.     Shortness of breath.     Pain, pressure, or a strange feeling in the back, neck, jaw, or upper belly or in one or both shoulders or arms.     Lightheadedness or sudden weakness.     A fast or irregular heartbeat.   After you call 911, the  may tell you to chew 1 adult-strength or 2 to 4 low-dose aspirin. Wait for an ambulance. Do not try to drive yourself.  Watch closely for changes in your health, and be sure to contact your doctor if you have any problems.  Where can you learn more?  Go to https://www.[employer].net/patientEd and enter F075 to learn more about \"A Healthy Heart: Care Instructions.\"  Current as of: June 24, 2023  Content Version: 14.1  © 5860-8055 [employer].   Care instructions adapted under license by [employer]. If you have questions about a medical condition or this instruction, always ask your healthcare professional. [employer] disclaims any warranty or liability for your use of this information.      Personalized Preventive Plan for Kalpana Robert - 6/11/2024  Medicare offers a range of preventive health benefits. Some of the tests and screenings are paid in full while other may be subject to a deductible, co-insurance, and/or copay.    Some of these benefits include a comprehensive review of your medical history including lifestyle, illnesses that may run in your family, and various assessments and screenings as appropriate.    After reviewing your medical record and screening and assessments performed today your provider may have ordered immunizations, labs, imaging, and/or referrals for you.  A list of these orders (if applicable) as well as your Preventive Care list are included within your After Visit Summary for your review.    Other Preventive Recommendations:    A preventive eye exam

## 2024-06-11 NOTE — PROGRESS NOTES
\"Have you been to the ER, urgent care clinic since your last visit?  Hospitalized since your last visit?\"    NO    “Have you seen or consulted any other health care providers outside of [facility] since your last visit?”    NO            Click Here for Release of Records Request  
0.083% nebulizer solution USE 1 VIAL VIA NEBULIZER  EVERY 4 HOURS AS NEEDED FOR WHEEZING      Calcium Carbonate-Vitamin D 600-3.125 MG-MCG TABS Take 1 tablet by mouth 2 times daily      metFORMIN (GLUCOPHAGE) 500 MG tablet TAKE 2 TABLETs BY MOUTH TWICE DAILY WITH MEALS      triamcinolone (KENALOG) 0.1 % cream Apply topically 2 times daily (Patient not taking: Reported on 9/19/2023)       No current facility-administered medications for this visit.     No Known Allergies     BMP:   Lab Results   Component Value Date/Time     03/23/2023 05:29 PM    K 3.9 03/23/2023 05:29 PM     03/23/2023 05:29 PM    CO2 26 03/23/2023 05:29 PM    BUN 14 03/23/2023 05:29 PM    CREATININE 0.94 03/23/2023 05:29 PM    GLUCOSE 102 03/23/2023 05:29 PM    CALCIUM 9.0 03/23/2023 05:29 PM      CBC:   Lab Results   Component Value Date/Time    WBC 8.2 03/23/2023 05:29 PM    RBC 4.66 03/23/2023 05:29 PM    HGB 13.8 03/23/2023 05:29 PM    HCT 44.1 03/23/2023 05:29 PM    MCV 94.6 03/23/2023 05:29 PM    MCH 29.6 03/23/2023 05:29 PM    MCHC 31.3 03/23/2023 05:29 PM    RDW 13.9 03/23/2023 05:29 PM     03/23/2023 05:29 PM    MPV 12.5 03/23/2023 05:29 PM      Lipids   Lab Results   Component Value Date/Time    CHOL 208 03/15/2023 09:17 AM    TRIG 77 03/15/2023 09:17 AM    HDL 60 03/15/2023 09:17 AM    CHOLHDLRATIO 3.5 03/15/2023 09:17 AM     Hemoglobin A1C:   Hemoglobin A1C   Date Value Ref Range Status   03/15/2023 7.4 (H) 4.0 - 5.6 % Final     Comment:     NEW METHOD  PLEASE NOTE NEW REFERENCE RANGE  (NOTE)  HbA1C Interpretive Ranges  <5.7              Normal  5.7 - 6.4         Consider Prediabetes  >6.5              Consider Diabetes       Hemoglobin A1C, POC   Date Value Ref Range Status   09/19/2023 7.9 % Final        Review of Systems     Physical Exam  Constitutional:       Appearance: Normal appearance.   HENT:      Head: Normocephalic and atraumatic.      Right Ear: Tympanic membrane normal.      Left Ear: Tympanic membrane

## 2024-09-03 RX ORDER — LISINOPRIL AND HYDROCHLOROTHIAZIDE 20; 25 MG/1; MG/1
TABLET ORAL
Qty: 100 TABLET | Refills: 2 | Status: SHIPPED | OUTPATIENT
Start: 2024-09-03

## 2024-11-19 ENCOUNTER — TELEPHONE (OUTPATIENT)
Age: 74
End: 2024-11-19

## 2024-11-19 DIAGNOSIS — N64.4 BREAST PAIN, LEFT: Primary | ICD-10-CM

## 2024-11-19 NOTE — TELEPHONE ENCOUNTER
Rain [facility] scheduling needs orders for diagnostic mammo (bi lateral) and US (left breast) Pt reports left breast pain.    Please put orders in system

## 2024-12-05 RX ORDER — ALBUTEROL SULFATE 90 UG/1
INHALANT RESPIRATORY (INHALATION)
Qty: 25.5 G | Refills: 2 | Status: SHIPPED | OUTPATIENT
Start: 2024-12-05

## 2024-12-10 ENCOUNTER — OFFICE VISIT (OUTPATIENT)
Age: 74
End: 2024-12-10
Payer: MEDICARE

## 2024-12-10 VITALS
OXYGEN SATURATION: 96 % | RESPIRATION RATE: 16 BRPM | HEART RATE: 97 BPM | WEIGHT: 214 LBS | SYSTOLIC BLOOD PRESSURE: 130 MMHG | BODY MASS INDEX: 40.4 KG/M2 | HEIGHT: 61 IN | DIASTOLIC BLOOD PRESSURE: 68 MMHG | TEMPERATURE: 97.7 F

## 2024-12-10 DIAGNOSIS — Z78.0 MENOPAUSE: ICD-10-CM

## 2024-12-10 DIAGNOSIS — I10 HYPERTENSION, UNSPECIFIED TYPE: ICD-10-CM

## 2024-12-10 DIAGNOSIS — Z12.39 ENCOUNTER FOR SCREENING FOR MALIGNANT NEOPLASM OF BREAST, UNSPECIFIED SCREENING MODALITY: ICD-10-CM

## 2024-12-10 DIAGNOSIS — E78.5 HYPERLIPIDEMIA, UNSPECIFIED HYPERLIPIDEMIA TYPE: ICD-10-CM

## 2024-12-10 DIAGNOSIS — E11.9 TYPE 2 DIABETES MELLITUS WITHOUT COMPLICATION, WITHOUT LONG-TERM CURRENT USE OF INSULIN (HCC): Primary | ICD-10-CM

## 2024-12-10 DIAGNOSIS — K50.10 CROHN'S DISEASE OF COLON WITHOUT COMPLICATION (HCC): ICD-10-CM

## 2024-12-10 DIAGNOSIS — R07.89 ATYPICAL CHEST PAIN: ICD-10-CM

## 2024-12-10 DIAGNOSIS — E66.01 MORBID (SEVERE) OBESITY DUE TO EXCESS CALORIES: ICD-10-CM

## 2024-12-10 DIAGNOSIS — J45.909 ASTHMATIC BRONCHITIS WITHOUT COMPLICATION, UNSPECIFIED ASTHMA SEVERITY, UNSPECIFIED WHETHER PERSISTENT: ICD-10-CM

## 2024-12-10 PROCEDURE — 99214 OFFICE O/P EST MOD 30 MIN: CPT | Performed by: INTERNAL MEDICINE

## 2024-12-10 PROCEDURE — 1159F MED LIST DOCD IN RCRD: CPT | Performed by: INTERNAL MEDICINE

## 2024-12-10 PROCEDURE — 1123F ACP DISCUSS/DSCN MKR DOCD: CPT | Performed by: INTERNAL MEDICINE

## 2024-12-10 PROCEDURE — 3078F DIAST BP <80 MM HG: CPT | Performed by: INTERNAL MEDICINE

## 2024-12-10 PROCEDURE — 3075F SYST BP GE 130 - 139MM HG: CPT | Performed by: INTERNAL MEDICINE

## 2024-12-10 PROCEDURE — 1126F AMNT PAIN NOTED NONE PRSNT: CPT | Performed by: INTERNAL MEDICINE

## 2024-12-10 RX ORDER — MESALAMINE 1.2 G/1
TABLET, DELAYED RELEASE ORAL PRN
COMMUNITY

## 2024-12-10 RX ORDER — ALBUTEROL SULFATE 0.83 MG/ML
2.5 SOLUTION RESPIRATORY (INHALATION) EVERY 4 HOURS PRN
Qty: 30 EACH | Refills: 1 | Status: SHIPPED | OUTPATIENT
Start: 2024-12-10

## 2024-12-10 SDOH — ECONOMIC STABILITY: FOOD INSECURITY: WITHIN THE PAST 12 MONTHS, YOU WORRIED THAT YOUR FOOD WOULD RUN OUT BEFORE YOU GOT MONEY TO BUY MORE.: NEVER TRUE

## 2024-12-10 SDOH — ECONOMIC STABILITY: INCOME INSECURITY: HOW HARD IS IT FOR YOU TO PAY FOR THE VERY BASICS LIKE FOOD, HOUSING, MEDICAL CARE, AND HEATING?: NOT HARD AT ALL

## 2024-12-10 SDOH — ECONOMIC STABILITY: FOOD INSECURITY: WITHIN THE PAST 12 MONTHS, THE FOOD YOU BOUGHT JUST DIDN'T LAST AND YOU DIDN'T HAVE MONEY TO GET MORE.: NEVER TRUE

## 2024-12-10 ASSESSMENT — PATIENT HEALTH QUESTIONNAIRE - PHQ9
1. LITTLE INTEREST OR PLEASURE IN DOING THINGS: SEVERAL DAYS
SUM OF ALL RESPONSES TO PHQ9 QUESTIONS 1 & 2: 2
SUM OF ALL RESPONSES TO PHQ QUESTIONS 1-9: 2
2. FEELING DOWN, DEPRESSED OR HOPELESS: SEVERAL DAYS
SUM OF ALL RESPONSES TO PHQ QUESTIONS 1-9: 2

## 2024-12-10 NOTE — PROGRESS NOTES
\"Have you been to the ER, urgent care clinic since your last visit?  Hospitalized since your last visit?\"    NO    “Have you seen or consulted any other health care providers outside our system since your last visit?”    NO      “Have you had a diabetic eye exam?”    YES - Where: [facility] Nurse/CMA to request most recent records if not in the chart     Date of last diabetic eye exam: 8/22/2017          
NEBULIZER  EVERY 4 HOURS AS NEEDED FOR WHEEZING      Calcium Carbonate-Vitamin D 600-3.125 MG-MCG TABS Take 1 tablet by mouth 2 times daily      triamcinolone (KENALOG) 0.1 % cream Apply topically 2 times daily (Patient not taking: Reported on 2023)       No current facility-administered medications for this visit.     No Known Allergies  Social History     Tobacco Use    Smoking status: Former     Current packs/day: 0.00     Types: Cigarettes     Quit date: 2011     Years since quittin.9    Smokeless tobacco: Never   Substance Use Topics    Alcohol use: No        BMP:   Lab Results   Component Value Date/Time     2024 10:12 AM    K 4.0 2024 10:12 AM     2024 10:12 AM    CO2 33 2024 10:12 AM    BUN 14 2024 10:12 AM    CREATININE 0.83 2024 10:12 AM    GLUCOSE 121 2024 10:12 AM    CALCIUM 9.6 2024 10:12 AM      CBC:   Lab Results   Component Value Date/Time    WBC 5.6 2024 10:12 AM    RBC 4.69 2024 10:12 AM    HGB 13.8 2024 10:12 AM    HCT 43.6 2024 10:12 AM    MCV 93.0 2024 10:12 AM    MCH 29.4 2024 10:12 AM    MCHC 31.7 2024 10:12 AM    RDW 13.7 2024 10:12 AM     2024 10:12 AM    MPV 12.5 2023 05:29 PM      Lipids   Lab Results   Component Value Date/Time    CHOL 188 2024 10:12 AM    TRIG 81 2024 10:12 AM    HDL 64 2024 10:12 AM    CHOLHDLRATIO 2.9 2024 10:12 AM     Hemoglobin A1C:   Hemoglobin A1C   Date Value Ref Range Status   03/15/2023 7.4 (H) 4.0 - 5.6 % Final     Comment:     NEW METHOD  PLEASE NOTE NEW REFERENCE RANGE  (NOTE)  HbA1C Interpretive Ranges  <5.7              Normal  5.7 - 6.4         Consider Prediabetes  >6.5              Consider Diabetes       Hemoglobin A1C, POC   Date Value Ref Range Status   2024 7.2 % Final        Review of Systems     Physical Exam  Constitutional:       Appearance: Normal appearance.   HENT:      Head:

## 2024-12-12 DIAGNOSIS — I10 HYPERTENSION, UNSPECIFIED TYPE: ICD-10-CM

## 2024-12-12 DIAGNOSIS — E78.5 HYPERLIPIDEMIA, UNSPECIFIED HYPERLIPIDEMIA TYPE: ICD-10-CM

## 2024-12-12 DIAGNOSIS — E11.9 TYPE 2 DIABETES MELLITUS WITHOUT COMPLICATION, WITHOUT LONG-TERM CURRENT USE OF INSULIN (HCC): ICD-10-CM

## 2024-12-13 LAB
ALBUMIN SERPL-MCNC: 3.7 G/DL (ref 3.5–5)
ALBUMIN/GLOB SERPL: 0.9 (ref 1.1–2.2)
ALP SERPL-CCNC: 134 U/L (ref 45–117)
ALT SERPL-CCNC: 22 U/L (ref 12–78)
ANION GAP SERPL CALC-SCNC: 5 MMOL/L (ref 2–12)
AST SERPL-CCNC: 18 U/L (ref 15–37)
BILIRUB SERPL-MCNC: 0.6 MG/DL (ref 0.2–1)
BUN SERPL-MCNC: 15 MG/DL (ref 6–20)
BUN/CREAT SERPL: 19 (ref 12–20)
CALCIUM SERPL-MCNC: 8.6 MG/DL (ref 8.5–10.1)
CHLORIDE SERPL-SCNC: 106 MMOL/L (ref 97–108)
CHOLEST SERPL-MCNC: 180 MG/DL
CO2 SERPL-SCNC: 32 MMOL/L (ref 21–32)
CREAT SERPL-MCNC: 0.78 MG/DL (ref 0.55–1.02)
EST. AVERAGE GLUCOSE BLD GHB EST-MCNC: 148 MG/DL
GLOBULIN SER CALC-MCNC: 3.9 G/DL (ref 2–4)
GLUCOSE SERPL-MCNC: 77 MG/DL (ref 65–100)
HBA1C MFR BLD: 6.8 % (ref 4–5.6)
HDLC SERPL-MCNC: 65 MG/DL
HDLC SERPL: 2.8 (ref 0–5)
LDLC SERPL CALC-MCNC: 100.6 MG/DL (ref 0–100)
POTASSIUM SERPL-SCNC: 4.4 MMOL/L (ref 3.5–5.1)
PROT SERPL-MCNC: 7.6 G/DL (ref 6.4–8.2)
SODIUM SERPL-SCNC: 143 MMOL/L (ref 136–145)
TRIGL SERPL-MCNC: 72 MG/DL
VLDLC SERPL CALC-MCNC: 14.4 MG/DL

## 2025-01-07 ENCOUNTER — HOSPITAL ENCOUNTER (OUTPATIENT)
Facility: HOSPITAL | Age: 75
Discharge: HOME | End: 2025-01-10
Attending: INTERNAL MEDICINE
Payer: MEDICARE

## 2025-01-07 DIAGNOSIS — N64.4 BREAST PAIN, LEFT: ICD-10-CM

## 2025-01-07 PROCEDURE — 77066 DX MAMMO INCL CAD BI: CPT

## 2025-01-07 PROCEDURE — 76642 ULTRASOUND BREAST LIMITED: CPT

## 2025-01-07 PROCEDURE — G0279 TOMOSYNTHESIS, MAMMO: HCPCS

## 2025-02-08 ENCOUNTER — APPOINTMENT (OUTPATIENT)
Facility: HOSPITAL | Age: 75
End: 2025-02-08
Payer: MEDICARE

## 2025-02-08 ENCOUNTER — HOSPITAL ENCOUNTER (EMERGENCY)
Facility: HOSPITAL | Age: 75
Discharge: HOME | End: 2025-02-08
Attending: EMERGENCY MEDICINE
Payer: MEDICARE

## 2025-02-08 VITALS
WEIGHT: 218.92 LBS | RESPIRATION RATE: 18 BRPM | TEMPERATURE: 98.2 F | SYSTOLIC BLOOD PRESSURE: 178 MMHG | OXYGEN SATURATION: 97 % | DIASTOLIC BLOOD PRESSURE: 79 MMHG | HEART RATE: 91 BPM | BODY MASS INDEX: 41.39 KG/M2

## 2025-02-08 DIAGNOSIS — J45.901 ASTHMA WITH ACUTE EXACERBATION, UNSPECIFIED ASTHMA SEVERITY, UNSPECIFIED WHETHER PERSISTENT: Primary | ICD-10-CM

## 2025-02-08 LAB
ALBUMIN SERPL-MCNC: 3.2 G/DL (ref 3.5–5)
ALBUMIN/GLOB SERPL: 0.8 (ref 1.1–2.2)
ALP SERPL-CCNC: 135 U/L (ref 45–117)
ALT SERPL-CCNC: 23 U/L (ref 12–78)
ANION GAP SERPL CALC-SCNC: 5 MMOL/L (ref 2–12)
AST SERPL-CCNC: 28 U/L (ref 15–37)
BASOPHILS # BLD: 0.03 K/UL (ref 0–0.1)
BASOPHILS NFR BLD: 0.4 % (ref 0–1)
BILIRUB SERPL-MCNC: 0.6 MG/DL (ref 0.2–1)
BUN SERPL-MCNC: 17 MG/DL (ref 6–20)
BUN/CREAT SERPL: 23 (ref 12–20)
CALCIUM SERPL-MCNC: 9.2 MG/DL (ref 8.5–10.1)
CHLORIDE SERPL-SCNC: 104 MMOL/L (ref 97–108)
CO2 SERPL-SCNC: 31 MMOL/L (ref 21–32)
CREAT SERPL-MCNC: 0.73 MG/DL (ref 0.55–1.02)
DIFFERENTIAL METHOD BLD: ABNORMAL
EOSINOPHIL # BLD: 0.46 K/UL (ref 0–0.4)
EOSINOPHIL NFR BLD: 6.3 % (ref 0–7)
ERYTHROCYTE [DISTWIDTH] IN BLOOD BY AUTOMATED COUNT: 13.5 % (ref 11.5–14.5)
GLOBULIN SER CALC-MCNC: 3.9 G/DL (ref 2–4)
GLUCOSE SERPL-MCNC: 87 MG/DL (ref 65–100)
HCT VFR BLD AUTO: 40.8 % (ref 35–47)
HGB BLD-MCNC: 13 G/DL (ref 11.5–16)
IMM GRANULOCYTES # BLD AUTO: 0.02 K/UL (ref 0–0.04)
IMM GRANULOCYTES NFR BLD AUTO: 0.3 % (ref 0–0.5)
LYMPHOCYTES # BLD: 2.82 K/UL (ref 0.8–3.5)
LYMPHOCYTES NFR BLD: 38.4 % (ref 12–49)
MCH RBC QN AUTO: 29.3 PG (ref 26–34)
MCHC RBC AUTO-ENTMCNC: 31.9 G/DL (ref 30–36.5)
MCV RBC AUTO: 91.9 FL (ref 80–99)
MONOCYTES # BLD: 0.66 K/UL (ref 0–1)
MONOCYTES NFR BLD: 9 % (ref 5–13)
NEUTS SEG # BLD: 3.35 K/UL (ref 1.8–8)
NEUTS SEG NFR BLD: 45.6 % (ref 32–75)
NRBC # BLD: 0 K/UL (ref 0–0.01)
NRBC BLD-RTO: 0 PER 100 WBC
PLATELET # BLD AUTO: 224 K/UL (ref 150–400)
PMV BLD AUTO: 11.7 FL (ref 8.9–12.9)
POTASSIUM SERPL-SCNC: 4.2 MMOL/L (ref 3.5–5.1)
PROT SERPL-MCNC: 7.1 G/DL (ref 6.4–8.2)
RBC # BLD AUTO: 4.44 M/UL (ref 3.8–5.2)
SODIUM SERPL-SCNC: 140 MMOL/L (ref 136–145)
TROPONIN I SERPL HS-MCNC: 6 NG/L (ref 0–51)
WBC # BLD AUTO: 7.3 K/UL (ref 3.6–11)

## 2025-02-08 PROCEDURE — 84484 ASSAY OF TROPONIN QUANT: CPT

## 2025-02-08 PROCEDURE — 93005 ELECTROCARDIOGRAM TRACING: CPT | Performed by: EMERGENCY MEDICINE

## 2025-02-08 PROCEDURE — 6370000000 HC RX 637 (ALT 250 FOR IP): Performed by: EMERGENCY MEDICINE

## 2025-02-08 PROCEDURE — 2580000003 HC RX 258: Performed by: EMERGENCY MEDICINE

## 2025-02-08 PROCEDURE — 85025 COMPLETE CBC W/AUTO DIFF WBC: CPT

## 2025-02-08 PROCEDURE — 99285 EMERGENCY DEPT VISIT HI MDM: CPT

## 2025-02-08 PROCEDURE — 71045 X-RAY EXAM CHEST 1 VIEW: CPT

## 2025-02-08 PROCEDURE — 80053 COMPREHEN METABOLIC PANEL: CPT

## 2025-02-08 PROCEDURE — 6360000002 HC RX W HCPCS: Performed by: EMERGENCY MEDICINE

## 2025-02-08 PROCEDURE — 36415 COLL VENOUS BLD VENIPUNCTURE: CPT

## 2025-02-08 PROCEDURE — 96374 THER/PROPH/DIAG INJ IV PUSH: CPT

## 2025-02-08 PROCEDURE — 94640 AIRWAY INHALATION TREATMENT: CPT

## 2025-02-08 RX ORDER — PREDNISONE 20 MG/1
40 TABLET ORAL DAILY
Qty: 10 TABLET | Refills: 0 | Status: SHIPPED | OUTPATIENT
Start: 2025-02-08 | End: 2025-02-13

## 2025-02-08 RX ORDER — DEXAMETHASONE SODIUM PHOSPHATE 10 MG/ML
10 INJECTION, SOLUTION INTRAMUSCULAR; INTRAVENOUS ONCE
Status: COMPLETED | OUTPATIENT
Start: 2025-02-08 | End: 2025-02-08

## 2025-02-08 RX ORDER — IPRATROPIUM BROMIDE AND ALBUTEROL SULFATE 2.5; .5 MG/3ML; MG/3ML
1 SOLUTION RESPIRATORY (INHALATION)
Status: COMPLETED | OUTPATIENT
Start: 2025-02-08 | End: 2025-02-08

## 2025-02-08 RX ORDER — 0.9 % SODIUM CHLORIDE 0.9 %
1000 INTRAVENOUS SOLUTION INTRAVENOUS ONCE
Status: COMPLETED | OUTPATIENT
Start: 2025-02-08 | End: 2025-02-08

## 2025-02-08 RX ORDER — NEBULIZER ACCESSORIES
1 KIT MISCELLANEOUS DAILY PRN
Qty: 1 KIT | Refills: 0 | Status: SHIPPED | OUTPATIENT
Start: 2025-02-08

## 2025-02-08 RX ADMIN — SODIUM CHLORIDE 1000 ML: 9 INJECTION, SOLUTION INTRAVENOUS at 20:03

## 2025-02-08 RX ADMIN — DEXAMETHASONE SODIUM PHOSPHATE 10 MG: 10 INJECTION, SOLUTION INTRAMUSCULAR; INTRAVENOUS at 18:31

## 2025-02-08 RX ADMIN — IPRATROPIUM BROMIDE AND ALBUTEROL SULFATE 1 DOSE: .5; 3 SOLUTION RESPIRATORY (INHALATION) at 18:22

## 2025-02-08 NOTE — ED PROVIDER NOTES
[facility] EMERGENCY DEPARTMENT  EMERGENCY DEPARTMENT ENCOUNTER       Pt Name: Kalpana Robert  MRN: [id number]  Birthdate 1950  Date of evaluation: 2/8/2025  Provider: Silas Villalobos MD   PCP: Lloyd Aguirre MD  Note Started: 8:51 PM 2/8/25     CHIEF COMPLAINT       Chief Complaint   Patient presents with    Shortness of Breath     Pt ambulatory to triage with c/o SOB x 3 weeks, reports having to use her rescue inhaler more than normal. Pt came to ER today d/t becoming very SOB while walking in a store.         HISTORY OF PRESENT ILLNESS: 1 or more elements      History From: Patient, History limited by: None     Kalpana Robert is a 74 y.o. female with a history of asthma, hypertension, diabetes who presents with shortness of breath.  She reports 3 weeks of shortness of breath, worsening over the past 24 hours.  She has had associated intermittent grabbing pain to her right lateral chest wall mid back.  She denies cough.  She has been using her inhaler at home with transient relief to her symptoms.     Nursing Notes were all reviewed and agreed with or any disagreements were addressed in the HPI.     REVIEW OF SYSTEMS        Positives and Pertinent negatives as per HPI.    PAST HISTORY     Past Medical History:  Past Medical History:   Diagnosis Date    Asthma     Crohn disease (HCC)     Diabetes (HCC)     Hypertension     Menopause        Past Surgical History:  Past Surgical History:   Procedure Laterality Date    COLONOSCOPY N/A 2/22/2022    COLONOSCOPY  needs rapid covid performed by Aleksey Mendez Jr., MD at [facility] ENDOSCOPY    COLONOSCOPY,BIOPSY  2/22/2022            Family History:  Family History   Problem Relation Age of Onset    Diabetes Mother     High Cholesterol Mother     Hypertension Mother     Diabetes Sister     Hypertension Sister        Social History:  Social History     Tobacco Use    Smoking status: Former     Current packs/day: 0.00     Types: Cigarettes     Quit date:

## 2025-02-09 LAB
EKG ATRIAL RATE: 91 BPM
EKG DIAGNOSIS: NORMAL
EKG P AXIS: 77 DEGREES
EKG P-R INTERVAL: 164 MS
EKG Q-T INTERVAL: 378 MS
EKG QRS DURATION: 80 MS
EKG QTC CALCULATION (BAZETT): 464 MS
EKG R AXIS: 17 DEGREES
EKG T AXIS: 67 DEGREES
EKG VENTRICULAR RATE: 91 BPM
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
## 2025-02-09 NOTE — PROGRESS NOTES
[facility] ([facility]) spoke with patient over the phone, established a safety plan and screened out.
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
## 2025-02-09 NOTE — DISCHARGE INSTRUCTIONS
%    Eosinophils % 6.3 0.0 - 7.0 %    Basophils % 0.4 0.0 - 1.0 %    Immature Granulocytes % 0.3 0.0 - 0.5 %    Neutrophils Absolute 3.35 1.80 - 8.00 K/UL    Lymphocytes Absolute 2.82 0.80 - 3.50 K/UL    Monocytes Absolute 0.66 0.00 - 1.00 K/UL    Eosinophils Absolute 0.46 (H) 0.00 - 0.40 K/UL    Basophils Absolute 0.03 0.00 - 0.10 K/UL    Immature Granulocytes Absolute 0.02 0.00 - 0.04 K/UL    Differential Type AUTOMATED     Comprehensive Metabolic Panel    Collection Time: 02/08/25  6:29 PM   Result Value Ref Range    Sodium 140 136 - 145 mmol/L    Potassium 4.2 3.5 - 5.1 mmol/L    Chloride 104 97 - 108 mmol/L    CO2 31 21 - 32 mmol/L    Anion Gap 5 2 - 12 mmol/L    Glucose 87 65 - 100 mg/dL    BUN 17 6 - 20 MG/DL    Creatinine 0.73 0.55 - 1.02 MG/DL    BUN/Creatinine Ratio 23 (H) 12 - 20      Est, Glom Filt Rate 86 >60 ml/min/1.73m2    Calcium 9.2 8.5 - 10.1 MG/DL    Total Bilirubin 0.6 0.2 - 1.0 MG/DL    ALT 23 12 - 78 U/L    AST 28 15 - 37 U/L    Alk Phosphatase 135 (H) 45 - 117 U/L    Total Protein 7.1 6.4 - 8.2 g/dL    Albumin 3.2 (L) 3.5 - 5.0 g/dL    Globulin 3.9 2.0 - 4.0 g/dL    Albumin/Globulin Ratio 0.8 (L) 1.1 - 2.2     Troponin    Collection Time: 02/08/25  6:29 PM   Result Value Ref Range    Troponin, High Sensitivity 6 0 - 51 ng/L     XR CHEST PORTABLE   Final Result   No Acute Disease.         Electronically signed by ELDER CORLEY          The exam and treatment you received in the Emergency Department were for an urgent problem and are not intended as complete care. It is important that you follow up with a doctor, nurse practitioner, or physician assistant for ongoing care. If your symptoms become worse or you do not improve as expected and you are unable to reach your usual health care provider, you should return to the Emergency Department. We are available 24 hours a day.    Please take your discharge instructions with you when you go to your follow-up appointment.     If a prescription has

## 2025-03-03 RX ORDER — ATORVASTATIN CALCIUM 20 MG/1
20 TABLET, FILM COATED ORAL DAILY
Qty: 100 TABLET | Refills: 2 | Status: SHIPPED | OUTPATIENT
Start: 2025-03-03

## 2025-04-18 ENCOUNTER — OFFICE VISIT (OUTPATIENT)
Age: 75
End: 2025-04-18
Payer: MEDICARE

## 2025-04-18 VITALS
BODY MASS INDEX: 40.37 KG/M2 | DIASTOLIC BLOOD PRESSURE: 76 MMHG | HEART RATE: 76 BPM | TEMPERATURE: 97.2 F | RESPIRATION RATE: 18 BRPM | HEIGHT: 61 IN | OXYGEN SATURATION: 100 % | WEIGHT: 213.8 LBS | SYSTOLIC BLOOD PRESSURE: 122 MMHG

## 2025-04-18 DIAGNOSIS — E11.9 TYPE 2 DIABETES MELLITUS WITHOUT COMPLICATION, WITHOUT LONG-TERM CURRENT USE OF INSULIN (HCC): ICD-10-CM

## 2025-04-18 DIAGNOSIS — M25.512 BILATERAL SHOULDER PAIN, UNSPECIFIED CHRONICITY: ICD-10-CM

## 2025-04-18 DIAGNOSIS — J44.9 CHRONIC OBSTRUCTIVE PULMONARY DISEASE, UNSPECIFIED COPD TYPE (HCC): ICD-10-CM

## 2025-04-18 DIAGNOSIS — M25.511 BILATERAL SHOULDER PAIN, UNSPECIFIED CHRONICITY: ICD-10-CM

## 2025-04-18 DIAGNOSIS — E66.813 OBESITY, CLASS 3: ICD-10-CM

## 2025-04-18 DIAGNOSIS — K50.10 CROHN'S DISEASE OF COLON WITHOUT COMPLICATION (HCC): Primary | ICD-10-CM

## 2025-04-18 DIAGNOSIS — J45.909 ASTHMATIC BRONCHITIS WITHOUT COMPLICATION, UNSPECIFIED ASTHMA SEVERITY, UNSPECIFIED WHETHER PERSISTENT: ICD-10-CM

## 2025-04-18 DIAGNOSIS — E11.9 TYPE 2 DIABETES MELLITUS WITHOUT COMPLICATION, WITHOUT LONG-TERM CURRENT USE OF INSULIN: ICD-10-CM

## 2025-04-18 LAB
EST. AVERAGE GLUCOSE BLD GHB EST-MCNC: 151 MG/DL
HBA1C MFR BLD: 6.9 % (ref 4–5.6)
TSH SERPL DL<=0.05 MIU/L-ACNC: 1.31 UIU/ML (ref 0.36–3.74)

## 2025-04-18 PROCEDURE — 1123F ACP DISCUSS/DSCN MKR DOCD: CPT | Performed by: INTERNAL MEDICINE

## 2025-04-18 PROCEDURE — 99214 OFFICE O/P EST MOD 30 MIN: CPT | Performed by: INTERNAL MEDICINE

## 2025-04-18 PROCEDURE — 1159F MED LIST DOCD IN RCRD: CPT | Performed by: INTERNAL MEDICINE

## 2025-04-18 PROCEDURE — 3074F SYST BP LT 130 MM HG: CPT | Performed by: INTERNAL MEDICINE

## 2025-04-18 PROCEDURE — 3078F DIAST BP <80 MM HG: CPT | Performed by: INTERNAL MEDICINE

## 2025-04-18 RX ORDER — ALBUTEROL SULFATE 0.83 MG/ML
2.5 SOLUTION RESPIRATORY (INHALATION) EVERY 4 HOURS PRN
Qty: 30 EACH | Refills: 5 | Status: SHIPPED | OUTPATIENT
Start: 2025-04-18

## 2025-04-18 SDOH — ECONOMIC STABILITY: FOOD INSECURITY: WITHIN THE PAST 12 MONTHS, THE FOOD YOU BOUGHT JUST DIDN'T LAST AND YOU DIDN'T HAVE MONEY TO GET MORE.: NEVER TRUE

## 2025-04-18 SDOH — ECONOMIC STABILITY: FOOD INSECURITY: WITHIN THE PAST 12 MONTHS, YOU WORRIED THAT YOUR FOOD WOULD RUN OUT BEFORE YOU GOT MONEY TO BUY MORE.: NEVER TRUE

## 2025-04-18 SDOH — ECONOMIC STABILITY: TRANSPORTATION INSECURITY
IN THE PAST 12 MONTHS, HAS THE LACK OF TRANSPORTATION KEPT YOU FROM MEDICAL APPOINTMENTS OR FROM GETTING MEDICATIONS?: NO

## 2025-04-18 SDOH — ECONOMIC STABILITY: INCOME INSECURITY: IN THE LAST 12 MONTHS, WAS THERE A TIME WHEN YOU WERE NOT ABLE TO PAY THE MORTGAGE OR RENT ON TIME?: NO

## 2025-04-18 SDOH — ECONOMIC STABILITY: TRANSPORTATION INSECURITY
IN THE PAST 12 MONTHS, HAS LACK OF TRANSPORTATION KEPT YOU FROM MEETINGS, WORK, OR FROM GETTING THINGS NEEDED FOR DAILY LIVING?: NO

## 2025-04-18 NOTE — PROGRESS NOTES
\"Have you been to the ER, urgent care clinic since your last visit?  Hospitalized since your last visit?\"    ER 2/2025 SOB    “Have you seen or consulted any other health care providers outside our system since your last visit?”    Cardiologist       “Have you had a diabetic eye exam?”    Scheduled     Date of last diabetic eye exam: 8/22/2017

## 2025-04-18 NOTE — PROGRESS NOTES
HISTORY OF PRESENT ILLNESS   Kalpana Robert   is a 74 y.o.  female.  F/u DM-2 htn hld obesity bmi 40 hx mild asthma Crohns colitis-asymptomatic      GI Dr Mendez-on lialda prn  CARD Dr Tsang--had  stress test earlier this year for CP--normal, normal echo last year  Last A1c 6.4   Fsbs on metformin and amaryl -134,        Was in ER 2/2025 for asthma exacerbation and SOB, cxr-nad. Nebs and steroids given in ER. Using albuterol daily for sob but usually not wheezing    C/o unable to lift up arms x 2 months/ difficulty with shoulder abduction        Last OV  Last A1c 7.2    Fsbs on metformin and amaryl in AM avg 144.    Still works 2 days per week  No s strict recently with diet and exercise  Has occassional CP when asleep x 2 months or when active washing dishes  Patient Active Problem List    Diagnosis Date Noted    Body mass index [BMI] 40.0-44.9, adult (Z68.41) 12/10/2024    Severe obesity 12/31/2018    Obesity 05/10/2011    HTN (hypertension) 11/08/2010    Colon polyp 02/21/2010    Asthma 02/21/2010    Colitis 02/21/2010    Pure hypercholesterolemia 02/21/2010    Fibrocystic breast 02/21/2010    Fibroid uterus 02/21/2010     Current Outpatient Medications   Medication Sig Dispense Refill    atorvastatin (LIPITOR) 20 MG tablet TAKE 1 TABLET BY MOUTH ONCE  DAILY 100 tablet 2    Respiratory Therapy Supplies (NEBULIZER/TUBING/MOUTHPIECE) KIT 1 kit by Does not apply route daily as needed (wheezing, shortness of breath) 1 kit 0    mesalamine (LIALDA) 1.2 g EC tablet as needed      albuterol (PROVENTIL) (2.5 MG/3ML) 0.083% nebulizer solution Take 3 mLs by nebulization every 4 hours as needed for Wheezing 30 each 1    albuterol sulfate HFA (PROVENTIL;VENTOLIN;PROAIR) 108 (90 Base) MCG/ACT inhaler USE 1 INHALATION BY MOUTH EVERY  4 HOURS AS NEEDED FOR WHEEZING 25.5 g 2    lisinopril-hydroCHLOROthiazide (PRINZIDE;ZESTORETIC) 20-25 MG per tablet TAKE 1 TABLET BY MOUTH DAILY 100 tablet 2

## 2025-04-19 ENCOUNTER — RESULTS FOLLOW-UP (OUTPATIENT)
Age: 75
End: 2025-04-19

## 2025-05-13 RX ORDER — LISINOPRIL AND HYDROCHLOROTHIAZIDE 20; 25 MG/1; MG/1
1 TABLET ORAL DAILY
Qty: 100 TABLET | Refills: 2 | Status: SHIPPED | OUTPATIENT
Start: 2025-05-13

## 2025-06-25 DIAGNOSIS — E11.9 TYPE 2 DIABETES MELLITUS WITHOUT COMPLICATION, WITHOUT LONG-TERM CURRENT USE OF INSULIN (HCC): ICD-10-CM

## 2025-06-25 RX ORDER — GLIMEPIRIDE 1 MG/1
1 TABLET ORAL EVERY MORNING
Qty: 100 TABLET | Refills: 2 | Status: SHIPPED | OUTPATIENT
Start: 2025-06-25

## 2025-08-18 RX ORDER — ALBUTEROL SULFATE 90 UG/1
INHALANT RESPIRATORY (INHALATION)
Qty: 25.5 G | Refills: 2 | Status: SHIPPED | OUTPATIENT
Start: 2025-08-18

## 2025-08-20 ENCOUNTER — TELEPHONE (OUTPATIENT)
Age: 75
End: 2025-08-20

## (undated) DEVICE — SYR 3ML LL TIP 1/10ML GRAD --

## (undated) DEVICE — Device

## (undated) DEVICE — CONTAINER SPEC 20 ML LID NEUT BUFF FORMALIN 10 % POLYPR STS

## (undated) DEVICE — TOWEL 4 PLY TISS 19X30 SUE WHT

## (undated) DEVICE — ELECTRODE,RADIOTRANSLUCENT,FOAM,5PK: Brand: MEDLINE

## (undated) DEVICE — CATH IV AUTOGRD BC PNK 20GA 25 -- INSYTE

## (undated) DEVICE — SYR 10ML LUER LOK 1/5ML GRAD --

## (undated) DEVICE — BAG SPEC BIOHZRD 10 X 10 IN --

## (undated) DEVICE — 1200 GUARD II KIT W/5MM TUBE W/O VAC TUBE: Brand: GUARDIAN

## (undated) DEVICE — NEONATAL-ADULT SPO2 SENSOR: Brand: NELLCOR

## (undated) DEVICE — SOLIDIFIER FLD 2OZ 1500CC N DISINF IN BTL DISP SAFESORB

## (undated) DEVICE — BASIN EMSIS 16OZ GRAPHITE PLAS KID SHP MOLD GRAD FOR ORAL

## (undated) DEVICE — Z DISCONTINUED PER MEDLINE LINE GAS SAMPLING O2/CO2 LNG AD 13 FT NSL W/ TBNG FILTERLINE

## (undated) DEVICE — FORCEPS BX L240CM JAW DIA2.8MM L CAP W/ NDL MIC MESH TOOTH

## (undated) DEVICE — HYPODERMIC SAFETY NEEDLE: Brand: MAGELLAN

## (undated) DEVICE — SET ADMIN 16ML TBNG L100IN 2 Y INJ SITE IV PIGGY BK DISP